# Patient Record
Sex: FEMALE | Race: WHITE | Employment: PART TIME | URBAN - METROPOLITAN AREA
[De-identification: names, ages, dates, MRNs, and addresses within clinical notes are randomized per-mention and may not be internally consistent; named-entity substitution may affect disease eponyms.]

---

## 2018-07-10 ENCOUNTER — HOSPITAL ENCOUNTER (OUTPATIENT)
Dept: PHYSICAL THERAPY | Age: 63
Discharge: HOME | End: 2018-07-10
Payer: COMMERCIAL

## 2018-07-10 PROCEDURE — 97162 PT EVAL MOD COMPLEX 30 MIN: CPT

## 2018-07-10 PROCEDURE — 97110 THERAPEUTIC EXERCISES: CPT

## 2018-07-10 NOTE — PROGRESS NOTES
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   ==================================================================================  Assessment / key information:  Pt is a 63 y.o. female who presents to [facility] at [facility] with Dx of R plantar fascitis. Patient reports initial onset of sx on 2018 with an EDDIE: insidious Patient reports sx are constant in nature. Walking, placing feet on the ground first thing in morning or after prolonged sitting increase sx, stretches decrease symptoms. Average reported pain level at 2-3/10, 10/10 at worst & 4/10 at best.  Patient reports improvement in symptoms while sleeping in DF night splint prescribed by D[facility] in [facility].     Objective evaluation findings are as follows: 1) tenderness to palpation along distal achilles achilles and on medial calcaneus. 2) DF with knee extended: R: 7, L 0 With knee flexed: R 10, L 7 degrees 3) notable increased calcaneal inversion and swelling below bilateral malloli L>R 4) minimal navicular drop noted with hyper mobile forefoot bilaterally 5) strength of R ankle ev, inv, and posterior tib is grossly 4-5, L foot is grossly 3+/5.       Patient can benefit from PT interventions to improve strength, range of motion, balance, proprioception, coordination, decrease pain, to facilitate ADLs & overall functional status.   ==================================================================================  Eval Complexity: History MEDIUM  Complexity : 1-2 comorbidities / personal factors will impact the outcome/ POC ;  Examination  MEDIUM Complexity : 3 Standardized tests and measures addressing body structure, function, activity limitation and / or participation in recreation ; Presentation MEDIUM Complexity : Evolving with changing characteristics ; Decision Making MEDIUM Complexity : FOTO score of 26-74; Overall Complexity MEDIUM  Problem List: pain affecting function, decrease ROM, decrease strength, edema affecting function, impaired gait/ balance, decrease ADL/ functional abilitiies, decrease activity tolerance, decrease flexibility/ joint mobility and decrease transfer abilities   Treatment Plan may include any combination of the following: Therapeutic exercise, Therapeutic activities, Neuromuscular re-education, Physical agent/modality, Gait/balance training, Manual therapy, Aquatic therapy, Patient education, Self Care training, Functional mobility training, Home safety training and Stair training  Patient / Family readiness to learn indicated by: asking questions, trying to perform skills and interest  Persons(s) to be included in education: patient (P)  Barriers to Learning/Limitations: None  Measures taken:    Patient Goal (s): \"pain relief\"   Patient self reported health status: excellent  Rehabilitation Potential: good   Short Term Goals: To be accomplished in  2  weeks:  1) Establish HEP to prevent further disability. 2) Patient will report decreased c/o pain to < or = 5/10 to facilitate ADLs with manageable sx in R foot. 3) Patient will be able to maintain SLS on firm ground for 30s with good stability to allow for ease with ADLs.  Long Term Goals:  To be accomplished in  4  weeks:  1) Pt ot be I and compliant with a progressive, high level HEP in order to maintain gains made in physical therapy. 2) patient to inc R DF AROM with knee extended by >/= 4 degrees to allow for ease with ambulation. 3) Patient will report 75% improvement in overall condition to return to PLOF. 4) patient will increase FOTO score to >/= 66 in order to allow for ease with ADLs. Frequency / Duration:   Patient to be seen  2  times per week for 4  weeks:  Patient / Caregiver education and instruction: self care, activity modification, brace/ splint application and exercises  G-Codes (GP): ABRAM  Therapist Signature: Eh Matos PT, DPT Date: 9/98/4806   Certification Period: NA Time: 6:03 PM   ===========================================================================================  I certify that the above Physical Therapy Services are being furnished while the patient is under my care. I agree with the treatment plan and certify that this therapy is necessary. Physician Signature:        Date:       Time:     Please sign and return to [facility] or you may fax the signed copy to [phone]. Thank you.

## 2018-07-12 ENCOUNTER — APPOINTMENT (OUTPATIENT)
Dept: PHYSICAL THERAPY | Age: 63
End: 2018-07-12
Payer: COMMERCIAL

## 2018-07-17 ENCOUNTER — HOSPITAL ENCOUNTER (OUTPATIENT)
Dept: PHYSICAL THERAPY | Age: 63
Discharge: HOME | End: 2018-07-17
Payer: COMMERCIAL

## 2018-07-17 PROCEDURE — 97110 THERAPEUTIC EXERCISES: CPT

## 2018-07-17 NOTE — PROGRESS NOTES
PHYSICAL THERAPY - DAILY TREATMENT NOTE    Patient Name: Viviane Parker        Date: 2018  : 1955   yes Patient  Verified  Visit #:     Insurance: Payor: Rico Barber / Plan: [place] [employer] P[place] / Product Type: PPO /      In time: 2:55P Out time: 3:40P   Total Treatment Time: 39     Medicare/ [facility] Time Tracking (below)   Total Timed Codes (min):  NA 1:1 Treatment Time:  NA     TREATMENT AREA =  Right foot pain [M79.671]    SUBJECTIVE  Pain Level (on 0 to 10 scale):  0  / 10   Medication Changes/New allergies or changes in medical history, any new surgeries or procedures?    no  If yes, update Summary List   Subjective Functional Status/Changes:  []  No changes reported     \"I feel great today, but had some bad days this past weekend. \"          OBJECTIVE  Modalities Rationale:PD   min [] Estim, type/location:                                      []  att     []  unatt     []  w/US     []  w/ice    []  w/heat    min []  Mechanical Traction: type/lbs                   []  pro   []  sup   []  int   []  cont    []  before manual    []  after manual    min []  Ultrasound, settings/location:      min []  Iontophoresis w/ dexamethasone, location:                                               []  take home patch       []  in clinic    min []  Ice     []  Heat    location/position:     min []  Vasopneumatic Device, press/temp:     min []  Other:    [] Skin assessment post-treatment (if applicable):    []  intact    []  redness- no adverse reaction     []redness - adverse reaction:        45 min Therapeutic Exercise:  [x]  See flow sheet   Rationale:      increase ROM, increase strength, improve coordination, improve balance and increase proprioception to improve the patients ability to perform unlimited ambulation      min Patient Education:  yes  Reviewed HEP   []  Progressed/Changed HEP based on: Other Objective/Functional Measures:    Initiated therex per flow sheet.   Quick fatigue with ankle 4 way  Poor control with BAPS      Post Treatment Pain Level (on 0 to 10) scale:   0  / 10     ASSESSMENT  Assessment/Changes in Function:     Patient tolerated initiation of therex well, has poor proprioception and motor control of the R ankle. []  See Progress Note/Recertification   Patient will continue to benefit from skilled PT services to modify and progress therapeutic interventions, address functional mobility deficits, address ROM deficits, address strength deficits, analyze and address soft tissue restrictions, analyze and cue movement patterns, analyze and modify body mechanics/ergonomics, assess and modify postural abnormalities, address imbalance/dizziness and instruct in home and community integration to attain remaining goals. Progress toward goals / Updated goals:    Progressing towards strength goals.       PLAN  [x]  Upgrade activities as tolerated yes Continue plan of care   []  Discharge due to :    []  Other:      Therapist: Alondra Coleman PT, DPT    Date: 7/17/2018 Time: 3:44 PM     Future Appointments  Date Time Provider Dunia Allen   7/19/2018 10:30 AM Elkin Salazar PT [facility]   7/24/2018 10:30 AM Norman Miles [facility]   7/26/2018 10:30 AM Elkin Salazar PT [facility]

## 2018-07-19 ENCOUNTER — HOSPITAL ENCOUNTER (OUTPATIENT)
Dept: PHYSICAL THERAPY | Age: 63
Discharge: HOME | End: 2018-07-19
Payer: COMMERCIAL

## 2018-07-19 PROCEDURE — 97110 THERAPEUTIC EXERCISES: CPT

## 2018-07-19 NOTE — PROGRESS NOTES
PHYSICAL THERAPY - DAILY TREATMENT NOTE    Patient Name: Xuan Mujica        Date: 2018  : 1955   YES Patient  Verified  Visit #:   3   of   12  Insurance: Payor: April Allison / Plan: [address] [place] [address] Sebastián West PPO / Product Type: PPO /      In time: 10:30 A Out time: 11:28 A   Total Treatment Time: 55/  40     Medicare Time Tracking (below)   Total Timed Codes (min):  NA 1:1 Treatment Time:  NA     TREATMENT AREA =  Right foot pain [M79.671]    SUBJECTIVE  Pain Level (on 0 to 10 scale):  3  / 10   Medication Changes/New allergies or changes in medical history, any new surgeries or procedures? NO    If yes, update Summary List   Subjective Functional Status/Changes:  []  No changes reported     Patient reports new complaints since last visit, her exercises are easier & not as painful.            OBJECTIVE  Modalities Rationale:     PD   min [] Estim, type/location:                                      []  att     []  unatt     []  w/US     []  w/ice    []  w/heat    min []  Mechanical Traction: type/lbs                   []  pro   []  sup   []  int   []  cont    []  before manual    []  after manual    min []  Ultrasound, settings/location:      min []  Iontophoresis w/ dexamethasone, location:                                               []  take home patch       []  in clinic    min []  Ice     []  Heat    location/position:     min []  Vasopneumatic Device, press/temp:     min []  Other:    [] Skin assessment post-treatment (if applicable):    []  intact    []  redness- no adverse reaction     []redness - adverse reaction:        55/  40 min Therapeutic Exercise:  [x]  See flow sheet   Rationale:      increase ROM and increase strength to improve the patients ability to return to pain-free standing      min Manual Therapy:    Rationale:          min Therapeutic Activity:    Rationale:         min Neuromuscular Re-ed:    Rationale:       min Gait Training:    Rationale:       min Patient Education:  Carlos Ferrell Reviewed HEP   []  Progressed/Changed HEP based on: Other Objective/Functional Measures:    TE per flow sheet     Post Treatment Pain Level (on 0 to 10) scale:   0  / 10     ASSESSMENT  Assessment/Changes in Function:     Difficulty with arch raises, due to weakness, improved tolerance to arch raises in sitting     []  See Progress Note/Recertification   Patient will continue to benefit from skilled PT services to modify and progress therapeutic interventions, address functional mobility deficits, address ROM deficits, address strength deficits, assess and modify postural abnormalities and instruct in home and community integration to attain remaining goals.    Progress toward goals / Updated goals:    Progressing towards STG 2, 3     PLAN  [x]  Upgrade activities as tolerated YES Continue plan of care   []  Discharge due to :    []  Other:      Therapist: Santiago Alexander PT    Date: 7/19/2018 Time: 1:32 PM     Future Appointments  Date Time Provider Dunia Allen   7/24/2018 10:30 AM [facility]   7/26/2018 3:30 PM [facility]

## 2018-07-24 ENCOUNTER — APPOINTMENT (OUTPATIENT)
Dept: PHYSICAL THERAPY | Age: 63
End: 2018-07-24
Payer: COMMERCIAL

## 2018-07-26 ENCOUNTER — APPOINTMENT (OUTPATIENT)
Dept: PHYSICAL THERAPY | Age: 63
End: 2018-07-26
Payer: COMMERCIAL

## 2018-07-31 ENCOUNTER — HOSPITAL ENCOUNTER (OUTPATIENT)
Dept: PHYSICAL THERAPY | Age: 63
Discharge: HOME | End: 2018-07-31
Payer: COMMERCIAL

## 2018-07-31 PROCEDURE — 97110 THERAPEUTIC EXERCISES: CPT

## 2018-07-31 NOTE — PROGRESS NOTES
PHYSICAL THERAPY - DAILY TREATMENT NOTE    Patient Name: Nikolay Flores        Date: 2018  : 1955   YES Patient  Verified  Visit #:     Insurance: Payor: Nena Reis / Plan: [place] [employer] PPO / Product Type: PPO /      In time: 12 P Out time: 12:50 P    Total Treatment Time: 45     Medicare Time Tracking (below)   Total Timed Codes (min):  NA 1:1 Treatment Time:  NA     TREATMENT AREA =  Right foot pain [M79.671]    SUBJECTIVE  Pain Level (on 0 to 10 scale):  0  / 10   Medication Changes/New allergies or changes in medical history, any new surgeries or procedures? NO    If yes, update Summary List   Subjective Functional Status/Changes:  []  No changes reported     Patient reports onset of pain in R 4th/5th toe on  when she banged her foot on something at home which brought her to the urgent care on , X-rays were (-). She can only tolerate wearing an open toed sandal/flip flop because of the pressure on her R outside of her foot. Her pain from the plantar fasciitis is much better, 75% improvement overall. Pain level at 8/10 at worst which is very rare & almost nonexistent, otherwise most of her pain is \"discomfort\", the pm splint helps a lot.            OBJECTIVE  Modalities Rationale:      min [] Estim, type/location:                                      []  att     []  unatt     []  w/US     []  w/ice    []  w/heat    min []  Mechanical Traction: type/lbs                   []  pro   []  sup   []  int   []  cont    []  before manual    []  after manual    min []  Ultrasound, settings/location:      min []  Iontophoresis w/ dexamethasone, location:                                               []  take home patch       []  in clinic    min []  Ice     []  Heat    location/position:     min []  Vasopneumatic Device, press/temp:     min []  Other:    [] Skin assessment post-treatment (if applicable):    []  intact    []  redness- no adverse reaction     []redness - adverse reaction: 40 min Therapeutic Exercise:  [x]  See flow sheet   Rationale:      increase ROM, increase strength, improve balance and increase proprioception to improve the patients ability to tolerate prolonged ambulation       min Manual Therapy:    Rationale:          min Therapeutic Activity:    Rationale:         min Neuromuscular Re-ed:    Rationale:       min Gait Training:    Rationale:       min Patient Education:  YES  Reviewed HEP   []  Progressed/Changed HEP based on: Other Objective/Functional Measures:    MMT: R ankle PF 4/5 (mild c/o pain upon MMT in WBing)  Df: pain-free active/passively WFL  SLS 30 secs     Post Treatment Pain Level (on 0 to 10) scale:   0  / 10     ASSESSMENT  Assessment/Changes in Function:     No change in status despite injury to R 4-5th toes, pt able to tolerate TE (modified per flow sheet) with no increase in R foot/ankle pain      []  See Progress Note/Recertification   Patient will continue to benefit from skilled PT services to modify and progress therapeutic interventions, address functional mobility deficits, address ROM deficits, address strength deficits, analyze and modify body mechanics/ergonomics, assess and modify postural abnormalities, address imbalance/dizziness and instruct in home and community integration to attain remaining goals.    Progress toward goals / Updated goals:    Progressing towards LTG 4, all STGs met, LTG 2, 3 met     PLAN  [x]  Upgrade activities as tolerated YES Continue plan of care   []  Discharge due to :    [x]  Other: Plan for DC n/v     Therapist: Hernan Delgado PT    Date: 7/31/2018 Time: 12:13 PM     Future Appointments  Date Time Provider Dunia Allen   8/2/2018 12:00 PM [facility] [place]

## 2018-08-02 ENCOUNTER — HOSPITAL ENCOUNTER (OUTPATIENT)
Dept: PHYSICAL THERAPY | Age: 63
Discharge: HOME | End: 2018-08-02
Payer: COMMERCIAL

## 2018-08-02 PROCEDURE — 97110 THERAPEUTIC EXERCISES: CPT

## 2018-08-02 NOTE — PROGRESS NOTES
PHYSICAL THERAPY - DAILY TREATMENT NOTE    Patient Name: Nohemi Tillman        Date: 2018  : 1955   yes Patient  Verified  Visit #:     Insurance: Payor: Frances Ponce / Plan: [place] [employer] PPO / Product Type: PPO /      In time: 12:00P Out time: 12:50P   Total Treatment Time: 48     Medicare/ [facility] Time Tracking (below)   Total Timed Codes (min):  NA 1:1 Treatment Time:  NA     TREATMENT AREA =  Right foot pain [M79.671]    SUBJECTIVE  Pain Level (on 0 to 10 scale):  6  / 10   Medication Changes/New allergies or changes in medical history, any new surgeries or procedures?    no  If yes, update Summary List   Subjective Functional Status/Changes:  []  No changes reported     \"My foot is hurting a lot today, but that is because I didn't do any of my exercises or stretching the last two days. \"          OBJECTIVE  Modalities Rationale:     decrease inflammation and decrease pain to improve patient's ability to perform unlimited ADLs   min [] Estim, type/location:                                      []  att     []  unatt     []  w/US     []  w/ice    []  w/heat    min []  Mechanical Traction: type/lbs                   []  pro   []  sup   []  int   []  cont    []  before manual    []  after manual    min []  Ultrasound, settings/location:      min []  Iontophoresis w/ dexamethasone, location:                                               []  take home patch       []  in clinic   10 min [x]  Ice     []  Heat    location/position: Long sit to R foot and ankle.      min []  Vasopneumatic Device, press/temp:     min []  Other:    [] Skin assessment post-treatment (if applicable):    []  intact    []  redness- no adverse reaction     []redness - adverse reaction:        40 min Therapeutic Exercise:  [x]  See flow sheet   Rationale:      increase ROM, increase strength, improve coordination, improve balance and increase proprioception to improve the patients ability to perform unlimited ADLs        min Patient Education:  yes  Reviewed HEP   []  Progressed/Changed HEP based on: Other Objective/Functional Measures:    See DC     Post Treatment Pain Level (on 0 to 10) scale:   2  / 10     ASSESSMENT  Assessment/Changes in Function:     See DC      []  See Progress Note/Recertification   Patient will continue to benefit from skilled PT services to modify and progress therapeutic interventions, address functional mobility deficits, address ROM deficits, address strength deficits, analyze and address soft tissue restrictions, analyze and cue movement patterns, analyze and modify body mechanics/ergonomics, assess and modify postural abnormalities, address imbalance/dizziness and instruct in home and community integration to attain remaining goals. Progress toward goals / Updated goals:    See DC      PLAN  []  Upgrade activities as tolerated no Continue plan of care   [x]  Discharge due to : Met or progressing towards all goals. []  Other:      Therapist: Irene Fay PT, DPT    Date: 8/2/2018 Time: 2:20 PM     No future appointments.

## 2018-08-09 NOTE — PROGRESS NOTES
[address] [place] [address] PHYSICAL THERAPY AT [address] [place] [address]  [address], [address] [place] [address], [address]  Phone: [phone]  Fax: [phone] SUMMARY  Patient Name: Sindy Haas : 1955   Treatment/Medical Diagnosis: Right foot pain [M79.671]   Referral Source: Marlee Mauro PA-C     Date of Initial Visit: 7/10/18 Attended Visits: 5 Missed Visits: 1     SUMMARY OF TREATMENT  Therapeutic exercise to increase strength, range of motion, balance, proprioception, coordination. Modalities as needed for pain control. CURRENT STATUS  Ms. Retana has made good progress with physical therapy. She reports that she feels 75% improved since starting physical therapy and understands the importance of maintaining her home exercise program.  Patient was agreeable to safe DC from physical therapy with a home exercise program.      Goal/Measure of Progress Goal Met? 1.  Pt ot be I and compliant with a progressive, high level HEP in order to maintain gains made in physical therapy. Status at last Eval: established Current Status: I and compliant yes   2.  patient to inc R DF AROM with knee extended by >/= 4 degrees to allow for ease with ambulation   Status at last Eval: DF with knee extended: R: 7, L 0 With knee flexed: R 10, L 7 degrees  Current Status: WNL passively yes   3. Patient will report 75% improvement in overall condition to return to PLOF. Status at last Eval: - Current Status: 75% yes   4.  patient will increase FOTO score to >/= 66 in order to allow for ease with ADLs   Status at last Eval: 46 Current Status: 62 no     RECOMMENDATIONS  Discontinue therapy. Progressing towards or have reached established goals. If you have any questions/comments please contact us directly at [phone]. Thank you for allowing us to assist in the care of your patient.     Therapist Signature: Kanika Blue PT, DPT Date: 18     Time: 12:08 PM

## 2022-08-12 ENCOUNTER — HOSPITAL ENCOUNTER (OUTPATIENT)
Dept: PHYSICAL THERAPY | Age: 67
Discharge: HOME | End: 2022-08-12
Payer: MEDICARE

## 2022-08-12 PROCEDURE — 97530 THERAPEUTIC ACTIVITIES: CPT | Performed by: PHYSICAL THERAPIST

## 2022-08-12 PROCEDURE — 97162 PT EVAL MOD COMPLEX 30 MIN: CPT | Performed by: PHYSICAL THERAPIST

## 2022-08-12 NOTE — PROGRESS NOTES
[address] PHYSICAL THERAPY AT [address] [place] [address]  Asif Mcghee [address], [address] ,[address], [address]  Phone: [phone]  Fax: [phone] / [address] [place] [address] PHYSICAL THERAPY SERVICES  Patient Name: Naila Sánchez : 1955   Medical   Diagnosis: Left ankle pain [M25.572] Treatment Diagnosis: S/p L ankle peroneal repair   Onset Date: 22     Referral Source: Jimi Rowell MD Start of Care [facility]): 2022   Prior Hospitalization: See medical history Provider #: [id number]   Prior Level of Function: Indep with ambulation and ADLS   Comorbidities: Hx of hepatitis   Medications: Verified on Patient Summary List   The Plan of Care and following information is based on the information from the initial evaluation.   =================================================================================  Assessment / key information:  Patient is a 61 y.o. female who presents to [facility] with Dx of L peroneal tear. Patient presents s/p L ankle peroneal repair. DOS; 22. Patient reports L ankle pain for >4yrs due to wear and tear. 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 Pt had R TKA ~1yr ago . MRI reports tear of Peroneal tendon. Pt was NWB in a splint for 2 weeks and then to be in CAM boot for 4 weeks. Paitient was given a brace for ankle to wean out of CAM boot at [facility]. Pt reports she is unable to wear brace due to discomfort at this time. Patient taking Oxy for pain control along with Motrin. Pain ranges from 2 to 5, with increased pain in am. Pain increases with WB out of boot, prolonged walking.   Gait: [] Normal    [] Abnormal    [x] Antalgic    [] NWB    Device:CAM boot  ROM/Strength  [] Unable to assess at this time      AROM        PROM            Strength (1-5)   Left Right Left Right Left  Right   Dorsiflexsion 3 14 6  4 5   Plantarflexsion 48 60 50  4 with break test 4+   Inversion 38 52 35  4 NT   Eversion 12 22 6  NT NT   Great Toe Ext 50 WNL   4+ 5   Great Toe Flex painful WNL full  4 5     Flexibility:   Gastroc/soleus:    (L) Tightness [] WNL   [] Min   [x] Mod   [] Severe    (R) Tightness [x] WNL   [] Min   [] Mod   [] Severe  Sensation: decreased to light \touch on lateral border of foot, TTP   Incision ~9cm over lateral ankle with good healing noted and no signs of infection  Balance: ROM EC: 30sec, Tandem L in rear: <5sec  Sub-talor alignment: [] Neutral     [] Pronation      [x] Supination: very mild calcaneal varus  Forefoot alignment:  [x] Neutral     [] Varus            [] Valgus  Swelling:  L :59.5 cm, R: 55.5 cm with figure 8   Patient scored 58 on FOTO indicating decreased functional activity level and QOL. A home exercise program was demonstrated and provided to address the above objective and functional deficits.  Patient can benefit from PT interventions to improve ROM, strength, balance, decrease pain and swelling, to facilitate ADLs & overall functional status.   =================================================================================  Eval Complexity: History: MEDIUM  Complexity : 1-2 comorbidities / personal factors will impact the outcome/ POC Exam:MEDIUM Complexity : 3 Standardized tests and measures addressing body structure, function, activity limitation and / or participation in recreation  Presentation: MEDIUM Complexity : Evolving with changing characteristics  Clinical   Decision Making:MEDIUM Complexity : FOTO score of 26-74Overall Complexity:MEDIUM  Problem List: pain affecting function, decrease ROM, decrease strength, edema affecting function, impaired gait/ balance, decrease ADL/ functional abilitiies, decrease activity tolerance, decrease flexibility/ joint mobility, and decrease transfer abilities   Treatment Plan may include any combination of the following: Therapeutic exercise, Therapeutic activities, Neuromuscular re-education, Physical agent/modality, Gait/balance training, Manual therapy, Aquatic therapy, Patient education, Self Care training, Functional mobility training, Home safety training, and Stair training  Patient / Family readiness to learn indicated by: asking questions, trying to perform skills, and interest  Persons(s) to be included in education: patient (P)  Barriers to Learning/Limitations: None  Measures taken:    Patient Goal (s): Walk without brace, increase strength   Patient self reported health status: good  Rehabilitation Potential: good  Short Term Goals: To be accomplished in  2  weeks:  1. Patient will be compliant with initial HEP for sx management to address the above listed deficits. 2. Patient will have decreased swelling to 57 cm (figure 8) to facilitate improved functional mobility end endurance and progression out of boot and into brace. Long Term Goals: To be accomplished in  8-12  treatments:  Patient to be independent & compliant with HEP in preparation for D/C. Patient to increase FOTO score to 68 indicating improved functional abilities and QOL. Patient to increase strength in L ankle globally  to 5 to facilitate prolonged standing and walking endurance. Patient to increase DF of L ankle to 10 deg to facilitate normal gait and stairs. Patient to increase  SLS to 15 sec for negotiation of obstacles and reduced risk of falls  Frequency / Duration:   Patient to be seen  2-3  times per week for 8-12  treatments:  (All LTG as above will be assessed and updated every 10 visits or 30 days and progressed as needed)  Patient / Caregiver education and instruction: exercises    Therapist Signature: Jair Ventura, PT Date: 6/53/5733   Certification Period: 8-12-22 to 11-11-22 Time: 7:52 AM   ===========================================================================================  I certify that the above Physical Therapy Services are being furnished while the patient is under my care. I agree with the treatment plan and certify that this therapy is necessary. Physician Signature:        Date:       Time:     Please sign and return to [facility] or you may fax the signed copy to [phone]. Thank you.                                         Flako Garg MD    Allergies checked: y

## 2022-08-12 NOTE — PROGRESS NOTES
PT  EVAL AND TREATMENT    Patient Name: Ty Duong  Date:2022  : 1955  [x]  Patient  Verified  Payor: Magy Renteria / Plan: [place] MED[place] & B / Product Type: Medicare /    In time:835  Out time:920am  Total Treatment Time (min): 45min  Total Timed Codes (min): 10  1:1 Treatment Time ( [address] only): 45   Visit #: 1 of     Treatment Area: Left ankle pain [M25.572]  Pain in: 2  Pain out 1-2    Objective evaluation:  Physical Therapy Evaluation  - Foot and Ankle  Patient presents s/p L ankle peroneal repair. DOS; 22. Patient reports L ankle pain for >4yrs due to wear and tear. 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 Pt had R TKA ~1yr ago . MRI reports tear of Peroneal tendon. Pt was NWB in a splint for 2 wks and then in CAM boot for 4 weeks. Paitient was given a brace for ankle to wean out of CAM boot. Pt reports she is unable to wear brace due to discomfort. Patient taking Oxy for pain control along with Motrin. Pain ranges from 2 to 5, with increased pain in am. Pain increases with WB out of boot, prolonged walking.   Gait: [] Normal    [] Abnormal    [x] Antalgic    [] NWB    Device:CAM boot  ROM/Strength  [] Unable to assess at this time      AROM        PROM            Strength (1-5)   Left Right Left Right Left  Right   Dorsiflexsion 3 14 6  4 5   Plantarflexsion 48 60 50  4 with break test 4+   Inversion 38 52 35  4 NT   Eversion 12 22 6  NT NT   Great Toe Ext 50 WNL   4+ 5   Great Toe Flex painful WNL full  4 5     Flexibility: [] Unable to assess at this time  Gastroc:    (L) Tightness [] WNL   [] Min   [x] Mod   [] Severe    (R) Tightness [x] WNL   [] Min   [] Mod   [] Severe  Palpation: decreased to light \touch on lateral border of foot,   Incision ~9cm over lateral ankle   Optional Tests:  Balance: ROM EC: 30sec, Tandem L in rear: <5sec  Sub-talor alignment: [] Neutral     [] Pronation      [x] Supination: very mild  Forefoot alignment:  [x] Neutral     [] Varus            [] Valgus  Calcaneal varus  Swelling:  L :59.5 cm, R: 55.5 cm with figure 8          Justification for Eval Code Complexity:  Patient History : Hx of hepatitis  Examination see exam as above   Clinical Presentation: evolving  Clinical Decision Making : FOTO : 58 /100    Modality rationale: decrease edema, decrease inflammation, and decrease pain to improve the patients ability to allow for normalized gait   Min Type Additional Details    [] Estim:  []Unatt       []IFC  []Premod                        []Other:  []w/ice   []w/heat  Position:  Location:    [] Estim: []Att    []TENS instruct  []NMES                    []Other:  []w/US   []w/ice   []w/heat  Position:  Location:    []  Traction: [] Cervical       []Lumbar                       [] Prone          []Supine                       []Intermittent   []Continuous Lbs:  [] before manual  [] after manual    []  Ultrasound: []Continuous   [] Pulsed                           []1MHz   []3MHz W/cm2:  Location:   10 [x]  Ice     []  heat  []  Ice massage  []  Laser   []  Anodyne Position: reclined to L ankle  Location:    []  Vasopneumatic Device  Pre-treatment girth:   Post-treatment girth:   Measured at landmark location:  Pressure:       [] lo [] med [] hi   Temperature: [] lo [] med [] hi   [] Skin assessment post-treatment:  []intact []redness- no adverse reaction    []redness - adverse reaction:   Vasopnuematic compression justification:  Per bilateral girth measures taken and listed above the edema is considered significant and having an impact on the patient's strength and gait        10 min Therapeutic Activity:  []  See flow sheet :Patient education on therapy assessment, prognosis, expectations for therapy sessions, patient goals, and HEP Development.    Rationale: increase ROM, increase strength, and improve coordination  to improve the patients ability to perform functional mobility and improve activity endurance          ASSESSMENT  [x]  See Plan of Care    PLAN  [x]  Upgrade activities as tolerated     [x] Other:_ POC  Patient to be seen 2-3 /wk for 8-12 treatments.        Cecil Belle, PT 8/12/2022  7:53 AM

## 2022-08-15 ENCOUNTER — APPOINTMENT (OUTPATIENT)
Dept: PHYSICAL THERAPY | Age: 67
End: 2022-08-15
Payer: MEDICARE

## 2022-08-17 ENCOUNTER — HOSPITAL ENCOUNTER (OUTPATIENT)
Dept: PHYSICAL THERAPY | Age: 67
Discharge: HOME | End: 2022-08-17
Payer: MEDICARE

## 2022-08-17 PROCEDURE — 97140 MANUAL THERAPY 1/> REGIONS: CPT

## 2022-08-17 PROCEDURE — 97112 NEUROMUSCULAR REEDUCATION: CPT

## 2022-08-17 PROCEDURE — 97110 THERAPEUTIC EXERCISES: CPT

## 2022-08-17 NOTE — PROGRESS NOTES
PHYSICAL THERAPY - DAILY TREATMENT NOTE     Patient Name: Forest Moseley        Date: 2022  : 1955   YES Patient  Verified  Visit #:   2   of     Insurance: Payor: Sawyer Sams / Plan: [place] MEDICARE PART A & B / Product Type: Medicare /      In time: 425 Out time: 684   Total Treatment Time: 55     Medicare/BCBS Time Tracking (below)   Total Timed Codes (min):  45 1:1 Treatment Time:  45     TREATMENT AREA =  Left ankle pain [M25.572]    SUBJECTIVE    Pain Level (on 0 to 10 scale):  2-5  / 10   Medication Changes/New allergies or changes in medical history, any new surgeries or procedures? NO    If yes, update Summary List   Subjective Functional Status/Changes:  []  No changes reported       Functional improvements: \"I am having a real hard time with this new brace. It is very uncomfortable. \"  Functional impairments: Decreased ROM and strength affecting gait and ADL's         OBJECTIVE  Modalities Rationale:     decrease edema, decrease inflammation, and decrease pain to improve patient's ability to ambulate w/o pain      min [] Estim, type/location:                                      []  att     []  unatt     []  w/US     []  w/ice    []  w/heat    min []  Mechanical Traction: type/lbs                   []  pro   []  sup   []  int   []  cont    []  before manual    []  after manual    min []  Ultrasound, settings/location:      min []  Iontophoresis w/ dexamethasone, location:                                               []  take home patch       []  in clinic   10 min [x]  Ice     []  Heat    location/position: Supine to left ankle    min []  Vasopneumatic Device, press/temp:  If using vaso (only need to measure limb vaso being performed on)      pre-treatment girth :       post-treatment girth :       measured at (landmark location) :       min []  Other:    [x] Skin assessment post-treatment (if applicable):    [x]  intact    [x]  redness- no adverse reaction     []redness - adverse reaction:      15 min Therapeutic Exercise:  [x]  See flow sheet   Rationale:      increase ROM and increase strength to improve the patients ability to perform ADL's w/ equal weight bearing and full active ROM     15 min Neuromuscular Re-ed: [x]  See flow sheet   Rationale:      increase strength, improve coordination, improve balance, and increase proprioception to improve the patients ability to ambulate with normal gait on even and uneven surfaces     10 min Manual Therapy: Technique:      [x] S/DTM []IASTM [x]PROM [x] Passive Stretching   []manual TPR    []Jt manipulation:Gr I [] II []  III [] IV[] V[]  Treatment Area:  L ankle all planes, scar mobilization   Rationale:      decrease pain, increase ROM, and increase tissue extensibility to improve patient's ability to ambulate w/ normal gait. The manual therapy interventions were performed at a separate and distinct time from the therapeutic activities interventions. Billed With/As:   [x] TE   [] TA   [x] Neuro   [] Self Care Patient Education: [x] Review HEP    [] Progressed/Changed HEP based on:   [] positioning   [] body mechanics   [] transfers   [] heat/ice application    [] other:        Other Objective/Functional Measures:    Reviwed and corrected HEP. Initiated therex/activities per flow sheet     Post Treatment Pain Level (on 0 to 10) scale:   0  / 10     ASSESSMENT    Assessment/Changes in Function:     Patient tolerated initiation of treatment well without increased pain. Significant difficulty with activities requiring intrinsic musculature and isolated movements of the left ankle.      []  See Progress Note/Recertification   Patient will continue to benefit from skilled PT services to modify and progress therapeutic interventions, address functional mobility deficits, address ROM deficits, address strength deficits, analyze and address soft tissue restrictions, analyze and cue movement patterns, analyze and modify body mechanics/ergonomics, and assess and modify postural abnormalities to attain remaining goals.       Progress toward goals / Updated goals:    Progressing towards newly established goals     PLAN    [x]  Upgrade activities as tolerated YES Continue plan of care   []  Discharge due to :    []  Other:      Therapist: Helene Pinto PT    Date: 8/17/2022 Time: 8:19 AM     Future Appointments   Date Time Provider Dunia Allen   8/17/2022 11:30 AM Yuridia Arredondo, PT [facility]   8/19/2022 11:30 AM Yuridia Arredondo, PT [facility]   8/23/2022  7:15 AM Puma Priest, PT [facility]   8/25/2022  7:15 AM Puma Priest, PT [facility]   8/29/2022 12:00 PM Amanda Griffiths, PT [facility]   8/31/2022 11:30 AM Yuridia Arredondo, PT [facility]   9/6/2022  7:15 AM Puma Priest, PT [facility]   9/8/2022  7:15 AM Nelly Beauchamp, PT [facility]

## 2022-08-19 ENCOUNTER — HOSPITAL ENCOUNTER (OUTPATIENT)
Dept: PHYSICAL THERAPY | Age: 67
Discharge: HOME | End: 2022-08-19
Payer: MEDICARE

## 2022-08-19 PROCEDURE — 97535 SELF CARE MNGMENT TRAINING: CPT

## 2022-08-19 PROCEDURE — 97110 THERAPEUTIC EXERCISES: CPT

## 2022-08-19 PROCEDURE — 97140 MANUAL THERAPY 1/> REGIONS: CPT

## 2022-08-19 NOTE — PROGRESS NOTES
PHYSICAL THERAPY - DAILY TREATMENT NOTE     Patient Name: Olga Lidia Rivera        Date: 2022  : 1955   YES Patient  Verified  Visit #:   3     Insurance: Payor: Ashely Crenshaw / Plan: [place] MEDICARE PART A & B / Product Type: Medicare /      In time: 3175 Out time: 1225   Total Treatment Time: 55     Medicare/BCBS Time Tracking (below)   Total Timed Codes (min):  45 1:1 Treatment Time:  45     TREATMENT AREA =  Left ankle pain [M25.572]    SUBJECTIVE    Pain Level (on 0 to 10 scale):  1  / 10   Medication Changes/New allergies or changes in medical history, any new surgeries or procedures? NO    If yes, update Summary List   Subjective Functional Status/Changes:  []  No changes reported       Functional improvements: \"I am trying to stay out of the boot more.   I brought the brace\"  Functional impairments: Decreased ROM, balance, and strength affecting ADL's and ambulation         OBJECTIVE  Modalities Rationale:     decrease edema, decrease inflammation, and decrease pain to improve patient's ability to ambulate w/o pain      min [] Estim, type/location:                                      []  att     []  unatt     []  w/US     []  w/ice    []  w/heat    min []  Mechanical Traction: type/lbs                   []  pro   []  sup   []  int   []  cont    []  before manual    []  after manual    min []  Ultrasound, settings/location:      min []  Iontophoresis w/ dexamethasone, location:                                               []  take home patch       []  in clinic   10 min [x]  Ice     []  Heat    location/position: Supine to L ankle following treatment session    min []  Vasopneumatic Device, press/temp:  If using vaso (only need to measure limb vaso being performed on)      pre-treatment girth :       post-treatment girth :       measured at (landmark location) :       min []  Other:    [x] Skin assessment post-treatment (if applicable):    [x]  intact    [x]  redness- no adverse reaction     []redness - adverse reaction:      20 min Therapeutic Exercise:  [x]  See flow sheet   Rationale:      increase ROM and increase strength to improve the patients ability to perform ADL's safely    5 NB min Neuromuscular Re-ed: [x]  See flow sheet   Rationale:      increase strength, improve coordination, improve balance, and increase proprioception to improve the patients ability to ambulate  on even and uneven surfaces w/ normal gait and no LOB    10 min Manual Therapy: Technique:      [x] S/DTM []IASTM [x]PROM [x] Passive Stretching   []manual TPR    []Jt manipulation:Gr I [] II []  III [] IV[] V[]  Treatment Area: L ankle    Rationale:      decrease pain, increase ROM, and decrease edema  to improve patient's ability to ambulate full dorsiflexion. The manual therapy interventions were performed at a separate and distinct time from the therapeutic activities interventions. 10 min Self Care: Instruction in don/doff left ankle brace. Cues for proper stance time and weight bearing through L LE   Rationale:     Proper brace use  to improve the patients ability to ambulate w/ normal gait and no pain    Billed With/As:   [x] TE   [] TA   [x] Neuro   [] Self Care Patient Education: [x] Review HEP    [] Progressed/Changed HEP based on:   [] positioning   [] body mechanics   [] transfers   [] heat/ice application    [] other:        Other Objective/Functional Measures:    See flow sheet  Mild resistance applied by therapist for all planes AROM     Post Treatment Pain Level (on 0 to 10) scale:   0  / 10     ASSESSMENT    Assessment/Changes in Function:     Improved intrinsic movement  noted today. Mild difficulty isolating IV/EV, requiring verbal and tactile cues. Fair tolerance to ankle brace; vc's required for increased stance time on left during ambulation with brace.      []  See Progress Note/Recertification   Patient will continue to benefit from skilled PT services to modify and progress therapeutic interventions, address functional mobility deficits, address ROM deficits, address strength deficits, analyze and address soft tissue restrictions, analyze and cue movement patterns, analyze and modify body mechanics/ergonomics, and assess and modify postural abnormalities to attain remaining goals.       Progress toward goals / Updated goals:    Good progress towards all STG's     PLAN     [x] Upgrade activities as tolerated YES Continue plan of care   []  Discharge due to :    []  Other:      Therapist: Deandre Small PT    Date: 8/19/2022 Time: 9:03 AM     Future Appointments   Date Time Provider Dunia Allen   8/19/2022 11:30 AM Obed Chowdary, PT [facility]   8/23/2022  7:15 AM Amrita Menchaca, PT [facility]   8/25/2022  7:15 AM Amrita Menchaca, PT [facility]   8/29/2022 12:00 PM Bill Diaz, PT [facility]   8/31/2022 11:30 AM Obed Chowdary, PT [facility]   9/6/2022  7:15 AM Amrita Menchaca PT [facility]   9/8/2022  7:15 AM Ryder Beauchamp, PT [facility]

## 2022-08-23 ENCOUNTER — HOSPITAL ENCOUNTER (OUTPATIENT)
Dept: PHYSICAL THERAPY | Age: 67
Discharge: HOME | End: 2022-08-23
Payer: MEDICARE

## 2022-08-23 PROCEDURE — 97110 THERAPEUTIC EXERCISES: CPT

## 2022-08-23 PROCEDURE — 97535 SELF CARE MNGMENT TRAINING: CPT

## 2022-08-23 PROCEDURE — 97112 NEUROMUSCULAR REEDUCATION: CPT

## 2022-08-23 NOTE — PROGRESS NOTES
PHYSICAL THERAPY - DAILY TREATMENT NOTE    Patient Name: Ted Rivera        Date: 2022  : 1955   YES Patient  Verified  Visit #:      12  Insurance: Payor: Hermes Campbell / Plan: [place] MED[place] & B / Product Type: Medicare /      In time: 7:15 A Out time: 8 A   Total Treatment Time: 45     BCBS/Medicare Time Tracking (below)   Total Timed Codes (min):  NA 1:1 Treatment Time:  NA     TREATMENT AREA =  Left ankle pain [M25.572]    SUBJECTIVE  Pain Level (on 0 to 10 scale):  1  / 10   Medication Changes/New allergies or changes in medical history, any new surgeries or procedures? NO    If yes, update Summary List   Subjective Functional Status/Changes:  []  No changes reported     Patient reports she doesn't wear her brace in the house but she wears her brace  when she goes to work & when she leave her home.          Modalities Rationale:     Patient deferred   min [] Estim, type/location:                                      []  att     []  unatt     []  w/US     []  w/ice    []  w/heat    min []  Mechanical Traction: type/lbs                   []  pro   []  sup   []  int   []  cont    []  before manual    []  after manual    min []  Ultrasound, settings/location:      min []  Iontophoresis w/ dexamethasone, location:                                               []  take home patch       []  in clinic    min []  Ice     []  Heat    location/position:     min []  Vasopneumatic Device, press/temp:    If using vaso (only need to measure limb vaso being performed on)      pre-treatment girth :       post-treatment girth :       measured at (landmark location) :      min []  Other:    [] Skin assessment post-treatment (if applicable):    []  intact    []  redness- no adverse reaction                  []redness - adverse reaction:        20 min Therapeutic Exercise:  [x]  See flow sheet   Rationale:      increase ROM and increase strength to improve the patients ability to return to pain-free standing     10 min Self Care: Reviewed use of ankle brace as well as appropriate shoewear to accommodate brace (patient came to treatment today wearing flip flops without brace)   Rationale:    increase ROM, increase strength, improve balance, and increase proprioception to improve the patients ability to decrease risk of fall with work/ADLs    15 min Neuromuscular Re-ed: [x]  See flow sheet   Rationale:    increase ROM, increase strength, improve balance, and increase proprioception to improve the patients ability to return to walking with improved gait mechanics      Billed With/As:   [] TE   [] TA   [] Neuro   [] Self Care Patient Education: [x] Review HEP    [] Progressed/Changed HEP based on:   [] positioning   [] body mechanics   [] transfers   [] heat/ice application    [] other:      Other Objective/Functional Measures: Added SLS on [address] board (B UE support) in both 1/3 & 2/4 planes of motion     Post Treatment Pain Level (on 0 to 10) scale:   0  / 10     ASSESSMENT  Assessment/Changes in Function:     Good tolerance to progression of balance exercises today     []  See Progress Note/Recertification   Patient will continue to benefit from skilled PT services to modify and progress therapeutic interventions, address functional mobility deficits, address ROM deficits, address strength deficits, analyze and address soft tissue restrictions, analyze and cue movement patterns, analyze and modify body mechanics/ergonomics, assess and modify postural abnormalities, and instruct in home and community integration to attain remaining goals.    Progress toward goals / Updated goals:    Progressing towards LTG 5     PLAN  []  Upgrade activities as tolerated YES Continue plan of care   []  Discharge due to :    []  Other:      Therapist: Richrad Julio, PT    Date: 8/23/2022 Time: 8:09 AM     Future Appointments   Date Time Provider Dunia Allen   8/25/2022  7:15 AM Micha Beauchamp, PT [facility]   8/29/2022 12:00 PM Toña Rosas, PT [facility]   8/31/2022 11:30 AM Summer Castle, PT [facility]   9/6/2022  7:15 AM Saundra Grove, PT [facility]   9/8/2022  7:15 AM Eliceo Beauchamp, PT [facility]

## 2022-08-25 ENCOUNTER — HOSPITAL ENCOUNTER (OUTPATIENT)
Dept: PHYSICAL THERAPY | Age: 67
Discharge: HOME | End: 2022-08-25
Payer: MEDICARE

## 2022-08-25 PROCEDURE — 97112 NEUROMUSCULAR REEDUCATION: CPT

## 2022-08-25 PROCEDURE — 97530 THERAPEUTIC ACTIVITIES: CPT

## 2022-08-25 PROCEDURE — 97110 THERAPEUTIC EXERCISES: CPT

## 2022-08-25 NOTE — PROGRESS NOTES
PHYSICAL THERAPY - DAILY TREATMENT NOTE    Patient Name: Romeo Mcclain        Date: 2022  : 1955   YES Patient  Verified  Visit #:     Insurance: Payor: [facility] [employer] / Plan: [place] MEDICARE PART A & B / Product Type: Medicare /      In time: 7:15 A Out time: 8:10 A   Total Treatment Time: 50     BCBS/Medicare Time Tracking (below)   Total Timed Codes (min):  40 1:1 Treatment Time:  40     TREATMENT AREA =  Left ankle pain [M25.572]    SUBJECTIVE  Pain Level (on 0 to 10 scale):  1  / 10   Medication Changes/New allergies or changes in medical history, any new surgeries or procedures? NO    If yes, update Summary List   Subjective Functional Status/Changes:  []  No changes reported     Patient reports no new complaints since last treatment, she will be going out of town to [facility] this weekend & she knows that she needs to wear her brace.             Modalities Rationale:     Patient deferred   min [] Estim, type/location:                                      []  att     []  unatt     []  w/US     []  w/ice    []  w/heat    min []  Mechanical Traction: type/lbs                   []  pro   []  sup   []  int   []  cont    []  before manual    []  after manual    min []  Ultrasound, settings/location:      min []  Iontophoresis w/ dexamethasone, location:                                               []  take home patch       []  in clinic    min []  Ice     []  Heat    location/position:     min []  Vasopneumatic Device, press/temp:    If using vaso (only need to measure limb vaso being performed on)      pre-treatment girth :       post-treatment girth :       measured at (landmark location) :      min []  Other:    [] Skin assessment post-treatment (if applicable):    []  intact    []  redness- no adverse reaction                  []redness - adverse reaction:        20 min Therapeutic Exercise:  [x]  See flow sheet   Rationale:      increase ROM and increase strength to improve the patients ability to return to pain-free standing     10 min Therapeutic Activity: [x]  See flow sheet   Rationale:    increase strength, improve coordination, improve balance, and increase proprioception to improve the patients ability to return to pain-free walking program    10 min Neuromuscular Re-ed: [x]  See flow sheet   Rationale:    improve coordination, improve balance, and increase proprioception to improve the patients ability to return to ADLs with decreased fall risk      Billed With/As:   [] TE   [] TA   [] Neuro   [] Self Care Patient Education: [x] Review HEP    [] Progressed/Changed HEP based on:   [] positioning   [] body mechanics   [] transfers   [] heat/ice application    [] other:      Other Objective/Functional Measures:    Progressed to yellow band with ankle PF/DF, EV/IV (see updated HEP)  Reviewed the importance of use of ankle brace in supportive shoe when going out of town this weekend for periods of prolonged standing/walking      Post Treatment Pain Level (on 0 to 10) scale:   0  / 10     ASSESSMENT  Assessment/Changes in Function:     Good tolerance to progression to gentle strengthening today, SLS continues to be difficult (although no c/o lateral ankle pain)     []  See Progress Note/Recertification   Patient will continue to benefit from skilled PT services to modify and progress therapeutic interventions, address functional mobility deficits, address ROM deficits, address strength deficits, analyze and address soft tissue restrictions, analyze and cue movement patterns, analyze and modify body mechanics/ergonomics, assess and modify postural abnormalities, address imbalance/dizziness, and instruct in home and community integration to attain remaining goals.    Progress toward goals / Updated goals:    Progressing towards LTG 3     PLAN  []  Upgrade activities as tolerated YES Continue plan of care   []  Discharge due to :    []  Other:      Therapist: Raz Blank PT    Date: 8/25/2022 Time: 8:19 AM     Future Appointments   Date Time Provider Dunia Allen   8/29/2022 12:00 PM [facility]on [facility]   8/31/2022 11:30 AM Rosemary Duran, PT [facility]   9/6/2022  7:15 AM Nicole Ramirez, PT [facility]   9/8/2022  7:15 AM Lance Beauchamp, PT [facility] no

## 2022-08-29 ENCOUNTER — HOSPITAL ENCOUNTER (OUTPATIENT)
Dept: PHYSICAL THERAPY | Age: 67
Discharge: HOME | End: 2022-08-29
Payer: MEDICARE

## 2022-08-29 PROCEDURE — 97110 THERAPEUTIC EXERCISES: CPT

## 2022-08-29 PROCEDURE — 97112 NEUROMUSCULAR REEDUCATION: CPT

## 2022-08-29 PROCEDURE — 97530 THERAPEUTIC ACTIVITIES: CPT

## 2022-08-29 NOTE — PROGRESS NOTES
PHYSICAL THERAPY - DAILY TREATMENT NOTE    Patient Name: Ariana Powell        Date: 2022  : 1955   YES Patient  Verified  Visit #:     Insurance: Payor: Jasmyn Brant / Plan: [place] MEDICARE PART A & B / Product Type: Medicare /      In time: 1200 Out time: 100   Total Treatment Time: 55     BCBS/Medicare Time Tracking (below)   Total Timed Codes (min):  45 1:1 Treatment Time:  40     TREATMENT AREA =  Left ankle pain [M25.572]    SUBJECTIVE  Pain Level (on 0 to 10 scale):  4  / 10   Medication Changes/New allergies or changes in medical history, any new surgeries or procedures?     NO    If yes, update Summary List   Subjective Functional Status/Changes:  []  No changes reported     Im more sore and swollen than normal but I was on my feet more over the weekend          Modalities Rationale:     decrease inflammation, decrease pain and increase tissue extensibility to improve patient's ability to perform ADLs   min [] Estim, type/location:                                     []  att     []  unatt     []  w/US     []  w/ice    []  w/heat    min []  Mechanical Traction: type/lbs                   []  pro   []  sup   []  int   []  cont    []  before manual    []  after manual    min []  Ultrasound, settings/location:      min []  Iontophoresis w/ dexamethasone, location:                                               []  take home patch       []  in clinic   10 min [x]  Ice     []  Heat    location/position: L ankle in supine, LE elevated    min []  Vasopneumatic Device, press/temp: If using vaso (only need to measure limb vaso being performed on)      pre-treatment girth :       post-treatment girth :       measured at (landmark location) :      min []  Other:    [x] Skin assessment post-treatment (if applicable):    [x]  intact    [x]  redness- no adverse reaction     []redness - adverse reaction:        20/15 min Therapeutic Exercise:  [x]  See flow sheet   Rationale:      increase ROM and increase strength to improve the patients ability to perform unlimted ADLs       10 min Therapeutic Activity: [x]  See flow sheet   Rationale:    increase ROM, increase strength, improve coordination, and mechanics to improve the patients ability to squat, negotiate stairs    15 min Neuromuscular Re-ed: [x]  See flow sheet   Rationale:    improve coordination, improve balance, and increase proprioception to improve the patients postural awareness, stability and motor control    Billed With/As:   [x] TE   [x] TA   [x] Neuro   [] Self Care Patient Education: [x] Review HEP    [] Progressed/Changed HEP based on:   [] positioning   [] body mechanics   [] transfers   [] heat/ice application    [] other:      Other Objective/Functional Measures:    See FS       Post Treatment Pain Level (on 0 to 10) scale:   3  / 10     ASSESSMENT  Assessment/Changes in Function:     Performed SLS on [place] board with use if 1 UE on HR. Cues to improve balance strategy, normalize distrubution and inc intrinsic recruitment during SLS. Cues to improve squat mechanics     []  See Progress Note/Recertification   Patient will continue to benefit from skilled PT services to modify and progress therapeutic interventions, address functional mobility deficits, address ROM deficits, address strength deficits, analyze and address soft tissue restrictions, analyze and cue movement patterns, analyze and modify body mechanics/ergonomics, assess and modify postural abnormalities, address imbalance/dizziness and instruct in home and community integration to attain remaining goals.    Progress toward goals / Updated goals:    Met STG     PLAN  [x]  Upgrade activities as tolerated YES Continue plan of care   []  Discharge due to :    []  Other:      Therapist: Fernanda Douglas PT, DPT, MTC, CMTPT    Date: 8/29/2022 Time: 6:34 PM     Future Appointments   Date Time Provider Dunia Allen   8/31/2022 11:30 AM Zeb Crane PT [facility]   9/6/2022  7:15 AM Aide Guzmán, PT [facility]   9/8/2022  7:15 AM Roman Beauchamp, PT [facility]

## 2022-08-31 ENCOUNTER — HOSPITAL ENCOUNTER (OUTPATIENT)
Dept: PHYSICAL THERAPY | Age: 67
Discharge: HOME | End: 2022-08-31
Payer: MEDICARE

## 2022-08-31 PROCEDURE — 97110 THERAPEUTIC EXERCISES: CPT

## 2022-08-31 PROCEDURE — 97140 MANUAL THERAPY 1/> REGIONS: CPT

## 2022-08-31 PROCEDURE — 97112 NEUROMUSCULAR REEDUCATION: CPT

## 2022-08-31 NOTE — PROGRESS NOTES
PHYSICAL THERAPY - DAILY TREATMENT NOTE     Patient Name: Serge Vazquez        Date: 2022  : 1955   YES Patient  Verified  Visit #:     Insurance: Payor: Sydnie Hazard / Plan: [place] MEDICARE PART A & B / Product Type: Medicare /      In time:  Out time:    Total Treatment Time: 50     Medicare/BCBS Time Tracking (below)   Total Timed Codes (min):  40 1:1 Treatment Time:  40     TREATMENT AREA =  Left ankle pain [M25.572]    SUBJECTIVE    Pain Level (on 0 to 10 scale):  1  / 10   Medication Changes/New allergies or changes in medical history, any new surgeries or procedures? NO    If yes, update Summary List   Subjective Functional Status/Changes:  []  No changes reported       Functional improvements: \"I feel better than I did on Monday and I think it is way less swollen. \"  Functional impairments: Decreased ROM, strength and ambulation, increased edema affecting ADL's         OBJECTIVE  Modalities Rationale:     decrease edema, decrease inflammation, and decrease pain to improve patient's ability to perform ADL's w/o pain      min [] Estim, type/location:                                      []  att     []  unatt     []  w/US     []  w/ice    []  w/heat    min []  Mechanical Traction: type/lbs                   []  pro   []  sup   []  int   []  cont    []  before manual    []  after manual    min []  Ultrasound, settings/location:      min []  Iontophoresis w/ dexamethasone, location:                                               []  take home patch       []  in clinic   10 min [x]  Ice     []  Heat    location/position: Supine to left ankle elevated    min []  Vasopneumatic Device, press/temp:  If using vaso (only need to measure limb vaso being performed on)      pre-treatment girth :       post-treatment girth :       measured at (landmark location) :       min []  Other:    [x] Skin assessment post-treatment (if applicable):    [x]  intact    [x]  redness- no adverse reaction []redness - adverse reaction:      15 min Therapeutic Exercise:  [x]  See flow sheet   Rationale:      increase ROM and increase strength to improve the patients ability to perform ADL's independently     15 min Neuromuscular Re-ed: [x]  See flow sheet   Rationale:      increase strength, improve coordination, improve balance, and increase proprioception to improve the patients ability to ambulate on even and uneven surfaces safely     10 min Manual Therapy: Technique:      [x] S/DTM []IASTM [x]PROM [x] Passive Stretching   []manual TPR    []Jt manipulation:Gr I [] II []  III [] IV[] V[]  Treatment Area: PROM all planes w/  STM for edema management    Rationale:      increase ROM, increase tissue extensibility, and decrease edema  to improve patient's ability to perform ADL's . The manual therapy interventions were performed at a separate and distinct time from the therapeutic activities interventions. Billed With/As:   [x] TE   [] TA   [x] Neuro   [] Self Care Patient Education: [x] Review HEP    [] Progressed/Changed HEP based on:   [] positioning   [] body mechanics   [] transfers   [] heat/ice application    [] other:        Other Objective/Functional Measures:    Per flow sheet  Fig 8 51 cm L (47.5 R)     Post Treatment Pain Level (on 0 to 10) scale:   0  / 10     ASSESSMENT    Assessment/Changes in Function:     Patient tolerated treatment session well. Notable improvement in edema compared to last session however continues to have some edema in left foot/ankle. Improvement noted in strength but continues to have decreased control with SLS, slight lateral pain w/ MOBE 1,3; improved with increased UE support.      []  See Progress Note/Recertification   Patient will continue to benefit from skilled PT services to modify and progress therapeutic interventions, address functional mobility deficits, address ROM deficits, address strength deficits, analyze and address soft tissue restrictions, analyze and cue movement patterns, analyze and modify body mechanics/ergonomics, and assess and modify postural abnormalities to attain remaining goals. Progress toward goals / Updated goals: All STG's met.  Good progress towards all LTG's     PLAN    [x]  Upgrade activities as tolerated YES Continue plan of care   []  Discharge due to :    []  Other:      Therapist: Lalito Alford PT    Date: 8/31/2022 Time: 8:15 AM     Future Appointments   Date Time Provider Dunia Allen   8/31/2022 11:30 AM Mert Estrada, PT [facility]   9/6/2022  7:15 AM Ligia Gill, PT [facility]   9/8/2022  7:15 AM Lake Beauchamp, PT [facility]

## 2022-09-06 ENCOUNTER — HOSPITAL ENCOUNTER (OUTPATIENT)
Dept: PHYSICAL THERAPY | Age: 67
Discharge: HOME | End: 2022-09-06
Payer: MEDICARE

## 2022-09-06 PROCEDURE — 97112 NEUROMUSCULAR REEDUCATION: CPT

## 2022-09-06 PROCEDURE — 97530 THERAPEUTIC ACTIVITIES: CPT

## 2022-09-06 PROCEDURE — 97110 THERAPEUTIC EXERCISES: CPT

## 2022-09-06 NOTE — PROGRESS NOTES
PHYSICAL THERAPY - DAILY TREATMENT NOTE    Patient Name: Keila Pearson        Date: 2022  : 1955   YES Patient  Verified  Visit #:     Insurance: Payor: Kurtis Vargas / Plan: [place] MEDICARE PART A & B / Product Type: Medicare /      In time: 7:15 A Out time: 8:10 A   Total Treatment Time: 50     BCBS/Medicare Time Tracking (below)   Total Timed Codes (min):  50 1:1 Treatment Time:  50     TREATMENT AREA =  Left ankle pain [M25.572]    SUBJECTIVE  Pain Level (on 0 to 10 scale):  1  / 10   Medication Changes/New allergies or changes in medical history, any new surgeries or procedures?     NO    If yes, update Summary List   Subjective Functional Status/Changes:  []  No changes reported     See progress note to MD            Modalities Rationale:    Patient deferred     min [] Estim, type/location:                                      []  att     []  unatt     []  w/US     []  w/ice    []  w/heat    min []  Mechanical Traction: type/lbs                   []  pro   []  sup   []  int   []  cont    []  before manual    []  after manual    min []  Ultrasound, settings/location:      min []  Iontophoresis w/ dexamethasone, location:                                               []  take home patch       []  in clinic    min []  Ice     []  Heat    location/position:     min []  Vasopneumatic Device, press/temp:    If using vaso (only need to measure limb vaso being performed on)      pre-treatment girth :       post-treatment girth :       measured at (landmark location) :      min []  Other:    [] Skin assessment post-treatment (if applicable):    []  intact    []  redness- no adverse reaction                  []redness - adverse reaction:        20 min Therapeutic Exercise:  [x]  See flow sheet   Rationale:      increase ROM and increase strength to improve the patients ability to tolerate prolonged ambulation      15 min Therapeutic Activity: [x]  See flow sheet   Rationale:    increase ROM, increase strength, improve balance, and increase proprioception to improve the patients ability to squat with = weightbearing    15 min Neuromuscular Re-ed: [x]  See flow sheet   Rationale:    improve coordination, improve balance, and increase proprioception to improve the patients ability to single leg activities, such as curb negotiation      Billed With/As:   [] TE   [] TA   [] Neuro   [] Self Care Patient Education: [x] Review HEP    [] Progressed/Changed HEP based on:   [] positioning   [] body mechanics   [] transfers   [] heat/ice application    [] other:      Other Objective/Functional Measures:    AROM/PROM: DF see progress note  SLS 7 seconds  MMT: PF 2/5, IV 4/5, IV 4 to 4+/5, DF 5-/5  Mild pain with passive overpressure IV     Post Treatment Pain Level (on 0 to 10) scale:   0  / 10     ASSESSMENT  Assessment/Changes in Function:     Steady progress with strength & balance     []  See Progress Note/Recertification   Patient will continue to benefit from skilled PT services to modify and progress therapeutic interventions, address functional mobility deficits, address ROM deficits, address strength deficits, analyze and address soft tissue restrictions, analyze and cue movement patterns, analyze and modify body mechanics/ergonomics, assess and modify postural abnormalities, address imbalance/dizziness, and instruct in home and community integration to attain remaining goals.    Progress toward goals / Updated goals:    See progress note for progress with goals      PLAN  []  Upgrade activities as tolerated YES Continue plan of care   []  Discharge due to :    []  Other:      Therapist: Charlotte Graves PT    Date: 9/6/2022 Time: 1:40 PM     Future Appointments   Date Time Provider Dunia Allen   9/8/2022  7:15 AM Saundra Grove PT [facility]   9/12/2022 11:45 AM ANDREA CrabtreePTNAEL [facility]   9/16/2022 11:45 AM ANDREA CrabtreePTNAEL [facility]   9/19/2022  5:15 PM Birder Fothergill, Durene Llanos, PT [facility]   9/26/2022  7:15 AM Rebel Prather, PT [facility]   9/29/2022  7:15 AM Melissa Beauchamp, PT [facility]

## 2022-09-06 NOTE — PROGRESS NOTES
[address] [place] [address] PHYSICAL THERAPY AT [address] [place] [address] [facility], [address] [place] [address], [address]  Phone: [phone]  Fax: [phone] OF CARE/RECERTIFICATION FOR PHYSICAL THERAPY          Patient Name: Darwyn Brittle : 1955   Treatment/Medical Diagnosis: Left ankle pain [M25.572]   Onset Date: 22    Referral Source: Amaya Gomez MD [facility]): 22   Prior Hospitalization: See Medical History Provider #: [id number]   Prior Level of Function: Indep with ambulation and ADLS   Comorbidities: Hx of hepatitis   Medications: Verified on Patient Summary List   Visits from [facility]: 8 Missed Visits: 0     Goal/Measure of Progress Goal Met? 1. Patient will be compliant with initial HEP for sx management to address the above listed deficits. Status at last Eval: NA Current Status: Compliant with HEP  yes   2. Patient will have decreased swelling to 57 cm (figure 8) to facilitate improved functional mobility end endurance and progression out of boot and into brace. Status at last Eval: 59.5 cm Current Status: 52.5 yes   3. Patient to increase FOTO score to 68 indicating improved functional abilities and QOL. Status at last Eval: 58 Current Status: 58 progressing   4. Patient to increase DF of L ankle to 10 deg to facilitate normal gait and stairs. Status at last Eval: 3 degrees Current Status: AROM DF: 5 deg  PROM PF: 8 deg progressing     Key Functional Changes/Progress: Mrs. Scarlet Prader continues to make slow but steady progress with PT, she continues to report fairly constant of aching/tightness in L ankle, intermittent c/o pain which she is taking oxy on a prn basis to manage. Primary c/o pain first thing in the morning & with progressive weightbearing activities.  She reports fair compliance with use of stability ankle brace in sneaker & continues to have weakness in L ankle with single leg activities with treatment as well as prolonged ambulation. Tolerance to progressive balance & strengthening continues to improve. Problem List: pain affecting function, decrease ROM, decrease strength, edema affecting function, impaired gait/ balance, decrease ADL/ functional abilitiies, decrease activity tolerance, decrease flexibility/ joint mobility, and decrease transfer abilities   Treatment Plan may include any combination of the following: Therapeutic exercise, Therapeutic activities, Neuromuscular re-education, Physical agent/modality, Gait/balance training, Manual therapy, Patient education, Self Care training, Functional mobility training, Home safety training, and Stair training  Patient Goal(s) has been updated and includes:  Walk without brace, increase strength   Goals for this certification period include and are to be achieved in   3-4  weeks:  Patient to increase FOTO score to 68 indicating improved functional abilities and QOL. Patient to increase strength in L ankle globally  to 5 to facilitate prolonged standing and walking endurance. Patient to increase DF of L ankle to 10 deg to facilitate normal gait and stairs. Patient to increase  SLS to 15 sec for negotiation of obstacles and reduced risk of falls  Frequency / Duration:   Patient to be seen   2   times per week for   4    weeks:    Assessments/Recommendations: Patient can benefit from continued PT interventions in order to progress towards achieving all LTGs. If you have any questions/comments please contact us directly at [phone]. Thank you for allowing us to assist in the care of your patient. Therapist Signature: LITA Mccain, cert MDT Date: 1/0/2905   Certification Period:  Reporting Period: 9-06-22 to 12-04-22 8-12-22 to 9-06-22  Time: 7:28 AM   NOTE TO PHYSICIAN:  PLEASE COMPLETE THE ORDERS BELOW AND FAX TO   [facility]: [phone]   If you are unable to process this request in 24 hours please contact our office: [phone].    ___ I have read the above report and request that my patient continue as recommended.   ___ I have read the above report and request that my patient continue therapy with the following changes/special instructions: ________________________________________________   ___ I have read the above report and request that my patient be discharged from therapy.      Physician Signature:                                                             Date:                                     Time:                                                                       Karin Wallace MD

## 2022-09-08 ENCOUNTER — HOSPITAL ENCOUNTER (OUTPATIENT)
Dept: PHYSICAL THERAPY | Age: 67
Discharge: HOME | End: 2022-09-08
Payer: MEDICARE

## 2022-09-08 PROCEDURE — 97110 THERAPEUTIC EXERCISES: CPT

## 2022-09-08 PROCEDURE — 97112 NEUROMUSCULAR REEDUCATION: CPT

## 2022-09-08 PROCEDURE — 97530 THERAPEUTIC ACTIVITIES: CPT

## 2022-09-08 NOTE — PROGRESS NOTES
PHYSICAL THERAPY - DAILY TREATMENT NOTE    Patient Name: Brock Hernandez        Date: 2022  : 1955   YES Patient  Verified  Visit #:     Insurance: Payor: Flori Pedersenors / Plan: [place] MED[place] PART A & B / Product Type: Medicare /      In time: 7:15 A Out time: 8:10 A   Total Treatment Time: 55     BCBS/Medicare Time Tracking (below)   Total Timed Codes (min):  45 1:1 Treatment Time:  45     TREATMENT AREA =  Left ankle pain [M25.572]    SUBJECTIVE  Pain Level (on 0 to 10 scale):  1  / 10   Medication Changes/New allergies or changes in medical history, any new surgeries or procedures? NO    If yes, update Summary List   Subjective Functional Status/Changes:  []  No changes reported     Patient reports she still has some pain on the outside of her ankle, she is more stiff in the morning but swelling has not been as bad this week.             Modalities Rationale:     decrease edema, decrease inflammation, and decrease pain to improve patient's ability to return to pain-free ADLs    min [] Estim, type/location:                                      []  att     []  unatt     []  w/US     []  w/ice    []  w/heat    min []  Mechanical Traction: type/lbs                   []  pro   []  sup   []  int   []  cont    []  before manual    []  after manual    min []  Ultrasound, settings/location:      min []  Iontophoresis w/ dexamethasone, location:                                               []  take home patch       []  in clinic   10 min [x]  Ice     []  Heat    location/position: Supine to L ankle    min []  Vasopneumatic Device, press/temp:    If using vaso (only need to measure limb vaso being performed on)      pre-treatment girth :       post-treatment girth :       measured at (landmark location) :      min []  Other:    [] Skin assessment post-treatment (if applicable):    [x]  intact    15  redness- no adverse reaction                  []redness - adverse reaction:        15 min Therapeutic Exercise:  [x]  See flow sheet   Rationale:      increase ROM and increase strength to improve the patients ability to return to pain-free standing     15 min Therapeutic Activity: [x]  See flow sheet   Rationale:    increase ROM, increase strength, improve coordination, improve balance, and increase proprioception to improve the patients ability to tolerate lifting when babysitting her grand daughter    15 min Neuromuscular Re-ed: [x]  See flow sheet   Rationale:    improve coordination, improve balance, and increase proprioception to improve the patients ability to return to curb negotiation       Billed With/As:   [] TE   [] TA   [] Neuro   [] Self Care Patient Education: [x] Review HEP    [] Progressed/Changed HEP based on:   [] positioning   [] body mechanics   [] transfers   [] heat/ice application    [] other:      Other Objective/Functional Measures:    Progressed to red band with ankle theraband, see below      Post Treatment Pain Level (on 0 to 10) scale:   0  / 10     ASSESSMENT  Assessment/Changes in Function:     Unable to tolerate heel raise in standing without increase in lateral ankle pain (no c/o pain with toe raises in standing), improved tolerance to heel raise in sitting, increased difficulty with 1, 3 planes of motion with [address] board     []  See Progress Note/Recertification   Patient will continue to benefit from skilled PT services to modify and progress therapeutic interventions, address functional mobility deficits, address ROM deficits, address strength deficits, analyze and address soft tissue restrictions, analyze and cue movement patterns, analyze and modify body mechanics/ergonomics, assess and modify postural abnormalities, address imbalance/dizziness, and instruct in home and community integration to attain remaining goals.    Progress toward goals / Updated goals:    Progressing towards achieving newly established LTGs     PLAN  []  Upgrade activities as tolerated YES Continue plan of care   []  Discharge due to :    []  Other:      Therapist: Joce Villegas PT    Date: 9/8/2022 Time: 10:58 AM     Future Appointments   Date Time Provider Dunia Allen   9/12/2022 11:45 AM Amrita Menchaca, PT [facility]   9/16/2022 11:45 AM Amrita Menchaca PT [facility]   9/19/2022  5:15 PM Bill Diaz, PT [facility]   9/26/2022  7:15 AM Amrita Menchaca PT [facility]   9/29/2022  7:15 AM Ryder Beauchamp, PT [facility]

## 2022-09-12 ENCOUNTER — HOSPITAL ENCOUNTER (OUTPATIENT)
Dept: PHYSICAL THERAPY | Age: 67
Discharge: HOME | End: 2022-09-12
Payer: MEDICARE

## 2022-09-12 PROCEDURE — 97530 THERAPEUTIC ACTIVITIES: CPT

## 2022-09-12 PROCEDURE — 97110 THERAPEUTIC EXERCISES: CPT

## 2022-09-12 PROCEDURE — 97112 NEUROMUSCULAR REEDUCATION: CPT

## 2022-09-12 NOTE — PROGRESS NOTES
PHYSICAL THERAPY - DAILY TREATMENT NOTE    Patient Name: Latisha Madrigal        Date: 2022  : 1955   YES Patient  Verified  Visit #:   10   of   12  Insurance: Payor: Belkys Megan / Plan: [place] MEDICARE PART A & B / Product Type: Medicare /      In time: 11:45 A Out time: 12:40 P   Total Treatment Time: 55     BCBS/Medicare Time Tracking (below)   Total Timed Codes (min):  45 1:1 Treatment Time:  45     TREATMENT AREA =  Left ankle pain [M25.572]    SUBJECTIVE  Pain Level (on 0 to 10 scale):  0  / 10   Medication Changes/New allergies or changes in medical history, any new surgeries or procedures? NO    If yes, update Summary List   Subjective Functional Status/Changes:  []  No changes reported     Patient reports she will be going to [address] she will be going to a dinner & has to be wear nicer shoes & she cannot get her L ankle into many shoes because of swelling & pain.            Modalities Rationale:     decrease edema, decrease inflammation, and decrease pain to improve patient's ability to return to pain-free ADLs   min [] Estim, type/location:                                      []  att     []  unatt     []  w/US     []  w/ice    []  w/heat    min []  Mechanical Traction: type/lbs                   []  pro   []  sup   []  int   []  cont    []  before manual    []  after manual    min []  Ultrasound, settings/location:      min []  Iontophoresis w/ dexamethasone, location:                                               []  take home patch       []  in clinic   10 min [x]  Ice     []  Heat    location/position: Supine to L ankle     min []  Vasopneumatic Device, press/temp:    If using vaso (only need to measure limb vaso being performed on)      pre-treatment girth :       post-treatment girth :       measured at (landmark location) :      min []  Other:    [] Skin assessment post-treatment (if applicable):    [x]  intact    [x]  redness- no adverse reaction                  []redness - adverse reaction:        15 min Therapeutic Exercise:  [x]  See flow sheet   Rationale:      increase ROM and increase strength to improve the patients ability to return to pain-free ambulation     15 min Therapeutic Activity: [x]  See flow sheet   Rationale:    improve coordination, improve balance, and increase proprioception to improve the patients ability to return to pain-free squatting    15 min Neuromuscular Re-ed: [x]  See flow sheet   Rationale:    improve coordination, improve balance, and increase proprioception to improve the patients ability to return to ADLs with decreased fall risk       Billed With/As:   [] TE   [] TA   [] Neuro   [] Self Care Patient Education: [x] Review HEP    [] Progressed/Changed HEP based on:   [] positioning   [] body mechanics   [] transfers   [] heat/ice application    [] other:      Other Objective/Functional Measures:    Progressed balance exercises to mSR on foam (heel to great toe)     Post Treatment Pain Level (on 0 to 10) scale:   0  / 10     ASSESSMENT  Assessment/Changes in Function:     Improved tolerance to attempts at standing heel raise today     []  See Progress Note/Recertification   Patient will continue to benefit from skilled PT services to modify and progress therapeutic interventions, address functional mobility deficits, address ROM deficits, address strength deficits, analyze and address soft tissue restrictions, analyze and cue movement patterns, analyze and modify body mechanics/ergonomics, assess and modify postural abnormalities, and instruct in home and community integration to attain remaining goals.    Progress toward goals / Updated goals:    Progressing towards LTG 1, 2     PLAN  []  Upgrade activities as tolerated YES Continue plan of care   []  Discharge due to :    []  Other:      Therapist: Arline Horn PT    Date: 9/12/2022 Time: 12:55 PM     Future Appointments   Date Time Provider Dunia Allen   9/16/2022 11:45 AM Nito Maria Elena [facility]   9/19/2022  5:15 PM Mejia Garcia, PT [facility]   9/26/2022  7:15 AM Scot Chauhan, PT [facility]   9/29/2022  7:15 AM Waldo Beauchamp, PT [facility]

## 2022-09-12 NOTE — PROGRESS NOTES
PHYSICAL THERAPY - DAILY TREATMENT NOTE    Patient Name: Brock Hernandez        Date: 2022  : 1955   YES Patient  Verified  Visit #:   10   of   12  Insurance: Payor: Flori Gallo / Plan: [place] MEDICARE PART A & B / Product Type: Medicare /      In time: 11:45 A Out time: 12:40 P   Total Treatment Time: 55     BCBS/Medicare Time Tracking (below)   Total Timed Codes (min):  45 1:1 Treatment Time:  45     TREATMENT AREA =  Left ankle pain [M25.572]    SUBJECTIVE  Pain Level (on 0 to 10 scale):  0  / 10   Medication Changes/New allergies or changes in medical history, any new surgeries or procedures? NO    If yes, update Summary List   Subjective Functional Status/Changes:  []  No changes reported     Patient reports she will be going to [address] she will be going to a dinner & has to be wear nicer shoes & she cannot get her L ankle into many shoes because of swelling & pain.            Modalities Rationale:     decrease edema, decrease inflammation, and decrease pain to improve patient's ability to return to pain-free ADLs   min [] Estim, type/location:                                      []  att     []  unatt     []  w/US     []  w/ice    []  w/heat    min []  Mechanical Traction: type/lbs                   []  pro   []  sup   []  int   []  cont    []  before manual    []  after manual    min []  Ultrasound, settings/location:      min []  Iontophoresis w/ dexamethasone, location:                                               []  take home patch       []  in clinic   10 min [x]  Ice     []  Heat    location/position: Supine to L ankle     min []  Vasopneumatic Device, press/temp:    If using vaso (only need to measure limb vaso being performed on)      pre-treatment girth :       post-treatment girth :       measured at (landmark location) :      min []  Other:    [] Skin assessment post-treatment (if applicable):    [x]  intact    [x]  redness- no adverse reaction                  []redness - adverse reaction:        15 min Therapeutic Exercise:  [x]  See flow sheet   Rationale:      increase ROM and increase strength to improve the patients ability to return to pain-free ambulation     15 min Therapeutic Activity: [x]  See flow sheet   Rationale:    improve coordination, improve balance, and increase proprioception to improve the patients ability to return to pain-free squatting    15 min Neuromuscular Re-ed: [x]  See flow sheet   Rationale:    improve coordination, improve balance, and increase proprioception to improve the patients ability to return to ADLs with decreased fall risk       Billed With/As:   [] TE   [] TA   [] Neuro   [] Self Care Patient Education: [x] Review HEP    [] Progressed/Changed HEP based on:   [] positioning   [] body mechanics   [] transfers   [] heat/ice application    [] other:      Other Objective/Functional Measures:    Progressed balance exercises to mSR on foam (heel to great toe)     Post Treatment Pain Level (on 0 to 10) scale:   0  / 10     ASSESSMENT  Assessment/Changes in Function:     Improved tolerance to attempts at standing heel raise today     []  See Progress Note/Recertification   Patient will continue to benefit from skilled PT services to modify and progress therapeutic interventions, address functional mobility deficits, address ROM deficits, address strength deficits, analyze and address soft tissue restrictions, analyze and cue movement patterns, analyze and modify body mechanics/ergonomics, assess and modify postural abnormalities, and instruct in home and community integration to attain remaining goals.    Progress toward goals / Updated goals:    Progressing towards LTG 1, 2     PLAN  []  Upgrade activities as tolerated YES Continue plan of care   []  Discharge due to :    []  Other:      Therapist: Venkata Patrick PT    Date: 9/12/2022 Time: 12:55 PM     Future Appointments   Date Time Provider Dunia Allen   9/16/2022 11:45 AM Nito Kirk [facility] [place] [facility]   9/19/2022  5:15 PM Matilde Asif, PT [facility]   9/26/2022  7:15 AM Susan Alfaro, PT [facility]   9/29/2022  7:15 AM Stefany Beauchamp, PT [facility]

## 2022-09-16 ENCOUNTER — HOSPITAL ENCOUNTER (OUTPATIENT)
Dept: PHYSICAL THERAPY | Age: 67
Discharge: HOME | End: 2022-09-16
Payer: MEDICARE

## 2022-09-16 PROCEDURE — 97112 NEUROMUSCULAR REEDUCATION: CPT

## 2022-09-16 PROCEDURE — 97110 THERAPEUTIC EXERCISES: CPT

## 2022-09-16 PROCEDURE — 97530 THERAPEUTIC ACTIVITIES: CPT

## 2022-09-16 NOTE — PROGRESS NOTES
PHYSICAL THERAPY - DAILY TREATMENT NOTE    Patient Name: Vineet Romero        Date: 2022  : 1955   YES Patient  Verified  Visit #:     Insurance: Payor: Saniya Altman / Plan: [place] MEDICARE PART A & B / Product Type: Medicare /      In time: 11:45 A Out time: 12:45 P   Total Treatment Time: 55     BCBS/Medicare Time Tracking (below)   Total Timed Codes (min):  45 1:1 Treatment Time:  45     TREATMENT AREA =  Left ankle pain [M25.572]    SUBJECTIVE  Pain Level (on 0 to 10 scale):  0  / 10   Medication Changes/New allergies or changes in medical history, any new surgeries or procedures? NO    If yes, update Summary List   Subjective Functional Status/Changes:  []  No changes reported     Patient reports she did well when she was out of town, she did a lot of walking.  Her ankle is swollen today because she has been on her feet            Modalities Rationale:     decrease edema, decrease inflammation, and decrease pain to improve patient's ability to return to pain-free ADLs    min [] Estim, type/location:                                      []  att     []  unatt     []  w/US     []  w/ice    []  w/heat    min []  Mechanical Traction: type/lbs                   []  pro   []  sup   []  int   []  cont    []  before manual    []  after manual    min []  Ultrasound, settings/location:      min []  Iontophoresis w/ dexamethasone, location:                                               []  take home patch       []  in clinic   10 min [x]  Ice     []  Heat    location/position: Supine to L ankle    min []  Vasopneumatic Device, press/temp:    If using vaso (only need to measure limb vaso being performed on)      pre-treatment girth :       post-treatment girth :       measured at (landmark location) :      min []  Other:    [] Skin assessment post-treatment (if applicable):    [x]  intact    [x]  redness- no adverse reaction                  []redness - adverse reaction:        15 min Therapeutic Exercise:  [x]  See flow sheet   Rationale:      increase ROM, increase strength, improve balance, and increase proprioception to improve the patients ability to return to normalized gait     15 min Therapeutic Activity: [x]  See flow sheet   Rationale:    improve coordination, improve balance, and increase proprioception to improve the patients ability to return to stair negotiation    15 min Neuromuscular Re-ed: [x]  See flow sheet   Rationale:    improve coordination, improve balance, and increase proprioception to improve the patients ability to tolerate ADLs with decreased fall risk      Billed With/As:   [] TE   [] TA   [] Neuro   [] Self Care Patient Education: [x] Review HEP    [] Progressed/Changed HEP based on:   [] positioning   [] body mechanics   [] transfers   [] heat/ice application    [] other:      Other Objective/Functional Measures:    Progressed to SR on foam today, added step ups     Post Treatment Pain Level (on 0 to 10) scale:   0  / 10     ASSESSMENT  Assessment/Changes in Function:     Patient unable to tolerate ankle PF in standing and eccentric lowering in standing without increase in lateral ankle pain,  limited by pain & weakness      []  See Progress Note/Recertification   Patient will continue to benefit from skilled PT services to modify and progress therapeutic interventions, address functional mobility deficits, address ROM deficits, address strength deficits, analyze and address soft tissue restrictions, analyze and cue movement patterns, analyze and modify body mechanics/ergonomics, assess and modify postural abnormalities, address imbalance/dizziness, and instruct in home and community integration to attain remaining goals.    Progress toward goals / Updated goals:    Progressing towards LTG 2, 4     PLAN  []  Upgrade activities as tolerated YES Continue plan of care   []  Discharge due to :    []  Other:      Therapist: Thao Rose, PT    Date: 9/16/2022 Time: 12:07 PM     Future Appointments   Date Time Provider Dunia Allen   9/19/2022  5:15 PM Jack Tena, PT [facility]   9/26/2022  7:15 AM Jody Ryan, PT [facility]   9/29/2022  7:15 AM Vito-Cruz, Rudean Phalen, PT [facility]

## 2022-09-19 ENCOUNTER — APPOINTMENT (OUTPATIENT)
Dept: PHYSICAL THERAPY | Age: 67
End: 2022-09-19
Payer: MEDICARE

## 2022-09-22 ENCOUNTER — APPOINTMENT (OUTPATIENT)
Dept: PHYSICAL THERAPY | Age: 67
End: 2022-09-22
Payer: MEDICARE

## 2022-09-26 ENCOUNTER — HOSPITAL ENCOUNTER (OUTPATIENT)
Dept: PHYSICAL THERAPY | Age: 67
Discharge: HOME | End: 2022-09-26
Payer: MEDICARE

## 2022-09-26 PROCEDURE — 97530 THERAPEUTIC ACTIVITIES: CPT

## 2022-09-26 PROCEDURE — 97110 THERAPEUTIC EXERCISES: CPT

## 2022-09-26 PROCEDURE — 97112 NEUROMUSCULAR REEDUCATION: CPT

## 2022-09-26 NOTE — PROGRESS NOTES
PHYSICAL THERAPY - DAILY TREATMENT NOTE    Patient Name: Davida Shannon        Date: 2022  : 1955   YES Patient  Verified  Visit #:     Insurance: Payor: Tita Hoffman / Plan: [place] MEDICARE PART A & B / Product Type: Medicare /      In time: 7:15 A Out time: 8:10 A   Total Treatment Time: 55     BCBS/Medicare Time Tracking (below)   Total Timed Codes (min):  45 1:1 Treatment Time:  45     TREATMENT AREA =  Left ankle pain [M25.572]    SUBJECTIVE  Pain Level (on 0 to 10 scale):  3  / 10   Medication Changes/New allergies or changes in medical history, any new surgeries or procedures?     NO    If yes, update Summary List   Subjective Functional Status/Changes:  []  No changes reported     See progress note to MD           Modalities Rationale:     decrease edema, decrease inflammation, and decrease pain to improve patient's ability to return to pain-free ADLs   min [] Estim, type/location:                                      []  att     []  unatt     []  w/US     []  w/ice    []  w/heat    min []  Mechanical Traction: type/lbs                   []  pro   []  sup   []  int   []  cont    []  before manual    []  after manual    min []  Ultrasound, settings/location:      min []  Iontophoresis w/ dexamethasone, location:                                               []  take home patch       []  in clinic   10 min [x]  Ice     []  Heat    location/position: Supine elevated to L ankle    min []  Vasopneumatic Device, press/temp:    If using vaso (only need to measure limb vaso being performed on)      pre-treatment girth :       post-treatment girth :       measured at (landmark location) :      min []  Other:    [] Skin assessment post-treatment (if applicable):    [x]  intact    [x]  redness- no adverse reaction                  []redness - adverse reaction:        15 min Therapeutic Exercise:  [x]  See flow sheet   Rationale:      increase ROM and increase strength to improve the patients ability to return to prolonged ambulation      15 min Therapeutic Activity: [x]  See flow sheet   Rationale:    increase strength, improve coordination, improve balance, and increase proprioception to improve the patients ability to previous walking program for exercise    15 min Neuromuscular Re-ed: [x]  See flow sheet   Rationale:    improve coordination, improve balance, and increase proprioception to improve the patients ability to return to safe ADLs with decreased fall risk       Billed With/As:   [] TE   [] TA   [] Neuro   [] Self Care Patient Education: [x] Review HEP    [] Progressed/Changed HEP based on:   [] positioning   [] body mechanics   [] transfers   [] heat/ice application    [] other:      Other Objective/Functional Measures:    FOTO no change  MMT see progress note     Post Treatment Pain Level (on 0 to 10) scale:   2 / 10     ASSESSMENT  Assessment/Changes in Function:     Mild c/o lateral ankle pain with resisted ankle PF as well as EV, SLS slowly improving      []  See Progress Note/Recertification   Patient will continue to benefit from skilled PT services to modify and progress therapeutic interventions, address functional mobility deficits, address ROM deficits, address strength deficits, analyze and address soft tissue restrictions, analyze and cue movement patterns, analyze and modify body mechanics/ergonomics, assess and modify postural abnormalities, address imbalance/dizziness, and instruct in home and community integration to attain remaining goals.    Progress toward goals / Updated goals:    LTG 3 met, LTG 4 partially met     PLAN  []  Upgrade activities as tolerated YES Continue plan of care   []  Discharge due to :    [x]  Other: Progress note to MD, follow up with MD on Friday     Therapist: Suly Barrientos PT    Date: 9/26/2022 Time: 7:48 AM     Future Appointments   Date Time Provider Dunia Allen   9/29/2022  7:15 AM Sterling Beauchamp, PT [facility]

## 2022-09-26 NOTE — PROGRESS NOTES
[address] [place] [address] PHYSICAL THERAPY AT [address] [place] [address] Taz Plass 01, [address] [place] [address], [address]  Phone: [phone]  Fax: [phone] OF CARE/RECERTIFICATION FOR PHYSICAL THERAPY          Patient Name: Vineet Romero : 1955   Treatment/Medical Diagnosis: Left ankle pain [M25.572]   Onset Date: 22    Referral Source: Victor Manuel Hutson MD Start of [facility]): 22   Prior Hospitalization: See Medical History Provider #: [id number]   Prior Level of Function: Indep with ambulation and ADLS   Comorbidities: Hx of hepatitis   Medications: Verified on Patient Summary List   Visits from [facility]: 12 Missed Visits: 0     Goal/Measure of Progress Goal Met? 1. Patient to increase FOTO score to 68 indicating improved functional abilities and QOL. Status at last Eval: 58 Current Status: 58  no   2. Patient to increase strength in L ankle globally  to 5 to facilitate prolonged standing and walking endurance. Status at last Eval: Limited Current Status: PF 2/5  DF 5-/5  IV 4 to 4+/5  EV 4/5 progressing   3. Patient to increase  SLS to 15 sec for negotiation of obstacles and reduced risk of falls   Status at last Eval: Unable Current Status: 10 secs progressing   4. Patient to increase DF of L ankle to 10 deg to facilitate normal gait and stairs. Status at last Eval: AROM DF: 5 deg  PROM PF: 8 deg Current Status: AROM DF: 8 deg  PROM PF: 10 deg (grossly = to R ankle) yes     Key Functional Changes/Progress: Mrs. Geetha Campa continues to make slow but steady progress with PT, she continues to report fairly constant of aching/tightness in L ankle, intermittent c/o pain with average pain level at 3/10, at worst 6-7/10. She continues to report primary c/o lateral ankle pain as well as moderate signs of edema with progressive weightbearing.   Improved compliance with use ankle brace although poor compliance with use of supportive shoe wear such as sneaker for support. Problem List: pain affecting function, decrease ROM, decrease strength, edema affecting function, impaired gait/ balance, decrease ADL/ functional abilitiies, decrease activity tolerance, decrease flexibility/ joint mobility, and decrease transfer abilities   Treatment Plan may include any combination of the following: Therapeutic exercise, Therapeutic activities, Neuromuscular re-education, Physical agent/modality, Gait/balance training, Manual therapy, Patient education, Self Care training, Functional mobility training, Home safety training, and Stair training  Patient Goal(s) has been updated and includes:  Walk without brace, increase strength   Goals for this certification period include and are to be achieved in   3-4  weeks:  Patient to increase FOTO score to 68 indicating improved functional abilities and QOL. Patient to increase strength in L ankle globally  to 5 to facilitate prolonged standing and walking endurance. Patient to increase  SLS to 15 sec for negotiation of obstacles and reduced risk of falls  Patient to report 50% improvement in overall function in preparation for return to recreational activities with manageable sx in L ankle. Frequency / Duration:   Patient to be seen   2   times per week for   3-4    weeks:    Assessments/Recommendations: Patient can benefit from continued PT interventions in order to progress towards achieving all LTGs. If you have any questions/comments please contact us directly at [phone]. Thank you for allowing us to assist in the care of your patient. Therapist Signature: LITA Dobbins, cert MDT Date: 6/55/3113   Certification Period:  Reporting Period: 9-26-22 to 12-24-22 9-06-22 to 9-26-22  Time: 7:28 AM   NOTE TO PHYSICIAN:  PLEASE COMPLETE THE ORDERS BELOW AND FAX TO   [facility] [place] [facility]: [phone].   If you are unable to process this request in 24 hours please contact our office: [phone].    ___ I have read the above report and request that my patient continue as recommended.   ___ I have read the above report and request that my patient continue therapy with the following changes/special instructions: ________________________________________________   ___ I have read the above report and request that my patient be discharged from therapy.      Physician Signature:                                                             Date:                                     Time:                                                                       Lauren Miller MD

## 2022-09-29 ENCOUNTER — HOSPITAL ENCOUNTER (OUTPATIENT)
Dept: PHYSICAL THERAPY | Age: 67
Discharge: HOME | End: 2022-09-29
Payer: MEDICARE

## 2022-09-29 PROCEDURE — 97110 THERAPEUTIC EXERCISES: CPT

## 2022-09-29 PROCEDURE — 97112 NEUROMUSCULAR REEDUCATION: CPT

## 2022-09-29 PROCEDURE — 97530 THERAPEUTIC ACTIVITIES: CPT

## 2022-09-29 NOTE — PROGRESS NOTES
PHYSICAL THERAPY - DAILY TREATMENT NOTE    Patient Name: Karie Roth        Date: 2022  : 1955   YES Patient  Verified  Visit #:   15   of   13  Insurance: Payor: Willian Sutton / Plan: [place] MEDICARE PART A & B / Product Type: Medicare /      In time: 7:15 A Out time: 8:15 A   Total Treatment Time: 55     BCBS/Medicare Time Tracking (below)   Total Timed Codes (min):  45 1:1 Treatment Time:  45     TREATMENT AREA =  Left ankle pain [M25.572]  SUBJECTIVE  Pain Level (on 0 to 10 scale):  1  / 10   Medication Changes/New allergies or changes in medical history, any new surgeries or procedures? NO    If yes, update Summary List   Subjective Functional Status/Changes:  []  No changes reported     Patient reports she has been more consistent with taking the ibuprofen & she feels better when she is wearing brace but she has a hard time getting it into her shoe.            Modalities Rationale:     decrease edema, decrease inflammation, and decrease pain to improve patient's ability to return to pain-free ADLs   min [] Estim, type/location:                                      []  att     []  unatt     []  w/US     []  w/ice    []  w/heat    min []  Mechanical Traction: type/lbs                   []  pro   []  sup   []  int   []  cont    []  before manual    []  after manual    min []  Ultrasound, settings/location:      min []  Iontophoresis w/ dexamethasone, location:                                               []  take home patch       []  in clinic   10 min [x]  Ice     []  Heat    location/position: Supine to L ankle    min []  Vasopneumatic Device, press/temp:    If using vaso (only need to measure limb vaso being performed on)      pre-treatment girth :       post-treatment girth :       measured at (landmark location) :      min []  Other:    [] Skin assessment post-treatment (if applicable):    [x]  intact    [x]  redness- no adverse reaction                  []redness - adverse reaction: 15 min Therapeutic Exercise:  [x]  See flow sheet   Rationale:      increase ROM and increase strength to improve the patients ability to return to pain-free walking program     15 min Therapeutic Activity: [x]  See flow sheet   Rationale:    increase ROM, increase strength, improve balance, and increase proprioception to improve the patients ability to negotiate stairs leading with L LE    15 min Neuromuscular Re-ed: [x]  See flow sheet   Rationale:    improve balance and increase proprioception to improve the patients ability to perform single leg activities such as curb negotiation        Billed With/As:   [] TE   [] TA   [] Neuro   [] Self Care Patient Education: [x] Review HEP    [] Progressed/Changed HEP based on:   [] positioning   [] body mechanics   [] transfers   [] heat/ice application    [] other:      Other Objective/Functional Measures: Added step ups on 6 inch step (see updated HEP), ankle theraband PF (with knee in slight flexion, heel on ground)     Post Treatment Pain Level (on 0 to 10) scale:   1  / 10     ASSESSMENT  Assessment/Changes in Function:     Good tolerance to current program, reviewed updated HEP, patient in agreement with being placed on hold, to continue with daily HEP & will follow up prn     []  See Progress Note/Recertification   Patient will continue to benefit from skilled PT services to analyze and address soft tissue restrictions, analyze and cue movement patterns, analyze and modify body mechanics/ergonomics, assess and modify postural abnormalities, address imbalance/dizziness, and instruct in home and community integration to attain remaining goals.    Progress toward goals / Updated goals:    Progressing towards newly established LTGs      PLAN  []  Upgrade activities as tolerated YES Continue plan of care   []  Discharge due to :    [x]  Other: Patient to be placed on hold      Therapist: Milli Walker PT    Date: 9/29/2022 Time: 8:08 AM     Future Appointments   Date Time Provider Dunia Allen   10/3/2022  7:00 AM Marcio Rader, PT [facility]   10/6/2022  7:20 AM Marcio Rader, PT [facility]   10/10/2022  7:00 AM Marcio Rader, PT [facility]   10/13/2022  7:40 AM Patricia Bustillos PT [facility]

## 2022-10-03 ENCOUNTER — APPOINTMENT (OUTPATIENT)
Dept: PHYSICAL THERAPY | Age: 67
End: 2022-10-03

## 2022-10-06 ENCOUNTER — APPOINTMENT (OUTPATIENT)
Dept: PHYSICAL THERAPY | Age: 67
End: 2022-10-06

## 2022-10-10 ENCOUNTER — APPOINTMENT (OUTPATIENT)
Dept: PHYSICAL THERAPY | Age: 67
End: 2022-10-10

## 2022-10-13 ENCOUNTER — APPOINTMENT (OUTPATIENT)
Dept: PHYSICAL THERAPY | Age: 67
End: 2022-10-13

## 2022-10-17 ENCOUNTER — APPOINTMENT (OUTPATIENT)
Dept: PHYSICAL THERAPY | Age: 67
End: 2022-10-17

## 2022-10-20 ENCOUNTER — APPOINTMENT (OUTPATIENT)
Dept: PHYSICAL THERAPY | Age: 67
End: 2022-10-20

## 2025-04-18 ENCOUNTER — HOSPITAL ENCOUNTER (OUTPATIENT)
Facility: HOSPITAL | Age: 70
Setting detail: RECURRING SERIES
Discharge: HOME | End: 2025-04-21
Payer: MEDICARE

## 2025-04-18 PROCEDURE — 97110 THERAPEUTIC EXERCISES: CPT

## 2025-04-18 PROCEDURE — 97162 PT EVAL MOD COMPLEX 30 MIN: CPT

## 2025-04-18 NOTE — PROGRESS NOTES
PHYSICAL / OCCUPATIONAL THERAPY - DAILY TREATMENT NOTE    Patient Name: Tete Aldrich-Day    Date: 2025    : 1955  Insurance: Payor: MEDICARE / Plan: MEDICARE PART A AND B / Product Type: *No Product type* /      Patient  verified Yes     Visit #   Current / Total 1 16   Time   In / Out 9:00 9:45   Pain   In / Out 3/10 3/10   Subjective Functional Status/Changes: See Eval/POC.     TREATMENT AREA =  Peroneal tendinitis, left leg    OBJECTIVE    35 min [x]Eval - untimed                      Therapeutic Procedures:    Tx Min Billable or 1:1 Min (if diff from Tx Min) Procedure, Rationale, Specifics   10 10 [id number] Therapeutic Exercise (timed):  increase ROM, strength, coordination, balance, and proprioception to improve patient's ability to progress to PLOF and address remaining functional goals. (see flow sheet as applicable)     Details if applicable:  Patient instructed in and briefly performed beginning HEP.  Handouts issued with pictures and written directions for HEP; copies placed in chart.          Details if applicable:            Details if applicable:            Details if applicable:            Details if applicable:     10 10 MC BC Totals Reminder: bill using total billable min of TIMED therapeutic procedures (example: do not include dry needle or estim unattended, both untimed codes, in totals to left)  8-22 min = 1 unit; 23-37 min = 2 units; 38-52 min = 3 units; 53-67 min = 4 units; 68-82 min = 5 units   Total Total     Charge Capture    [x]  Patient Education billed concurrently with other procedures   [x] Review HEP    [] Progressed/Changed HEP, detail:    [] Other detail:       Objective Information/Functional Measures/Assessment    See Eval/POC.    Patient will continue to benefit from skilled PT / OT services to modify and progress therapeutic interventions, analyze and address functional mobility deficits, analyze and address ROM deficits, analyze and address strength deficits, analyze

## 2025-04-18 NOTE — THERAPY EVALUATION
begin mowing lawn with self-propelled walk-behind mower due to  with cardiac issues--bothersome for ankle.  Household Chores:  OK for indoor chores, but carrying laundry up stairs.  Driving:  automatic transmission and OK; OK getting into car, but painful when first out of car after driving home from working all day.  :  NA; to visit with grandchildren in [facility] 4 days soon (ages 6-12 years old); Also to visit with 4 year old grandchild in [place] soon and running around and up/down for play is painful for left ankle.  Pet Care:  NA.  ADLs:  OK in/out of walk-in shower and standing to bathe; OK standing for grooming and dressing; limited to orthopedic tennis shoes for work and [employer] sandals and slides; now wearing cam boot.  Sleep:  Poor ability to fall asleep, but not due to ankle pain, can lie in any desired position; may be awakened in the night by ankle pain; feels better in the mornings and worse as day goes on.  Recreation/Fitness:  walking, beach walking,     Pain Scale:  3/10 currently, ranging from 3/10 up to 9/10.  LEFS Score:  44/80.  Observation(sitting, feet on floor):  high bilat foot arches, toes rectus, bilat calcaneal inversion; swelling about left ankle, lateral > medial.  Observation/Posture (standing):  moderate bilat foot arches and bilat calcaneal inversion; swelling about left ankle, lateral > medial.  Sit <==> Stand:  WFL, symmetrical.  Gait:  slowed purnima, mildly antalgic left with decreased stance time and pain in weightbearing, especially push-off.  Bend/Squat:  Not Tested.  Single Leg Stance (SLS):  Able and OK left and right; balance steady and >/= 15 seconds right, but ~8 seconds left.  Heel Raises in SLS:  WFL and OK right; ~4 to 4+/5 left with discomfort.  Heel Walking:  able on right >> left.  Left LE AROM (seated):  Ankle PF 65 deg with calf cramping; DF 7 deg; INV 40 deg with end range discomfort; EV 20 deg with discomfort.  Pt notes and demonstrated poor ability

## 2025-04-22 ENCOUNTER — HOSPITAL ENCOUNTER (OUTPATIENT)
Facility: HOSPITAL | Age: 70
Setting detail: RECURRING SERIES
Discharge: HOME | End: 2025-04-25
Payer: MEDICARE

## 2025-04-22 PROCEDURE — 97110 THERAPEUTIC EXERCISES: CPT

## 2025-04-22 PROCEDURE — 97140 MANUAL THERAPY 1/> REGIONS: CPT

## 2025-04-22 PROCEDURE — 97035 APP MDLTY 1+ULTRASOUND EA 15: CPT

## 2025-04-22 NOTE — PROGRESS NOTES
PHYSICAL / OCCUPATIONAL THERAPY - DAILY TREATMENT NOTE    Patient Name: Tete Aldrich-Day    Date: 2025    : 1955  Insurance: Payor: MEDICARE / Plan: MEDICARE PART A AND B / Product Type: *No Product type* /      Patient  verified Yes     Visit #   Current / Total 2 16   Time   In / Out 5:43 6:21   Pain   In / Out 6/10 6/10   Subjective Functional Status/Changes: Pt recently returned from trip to [place] and being with grandchildren--wore boot, but on feet a lot.  Got home ~1AM this morning and now just off from work (wore boot).  Pt noting elevated left lateral ankle pain and swelling.  Pt has done a little bit of HEP since evaluation visit--no problems/questions.     TREATMENT AREA =  Peroneal tendinitis, left leg  Left ankle pain  Left foot pain    OBJECTIVE    Modalities Rationale:     decrease edema, decrease inflammation, decrease pain, and increase tissue extensibility to improve patient's ability to progress to PLOF and address remaining functional goals.     min [] Estim Unattended, type/location:                                      []  w/ice    []  w/heat    min [] Estim Attended, type/location:                                     []  w/US     []  w/ice    []  w/heat    []  TENS insruct      min []  Mechanical Traction: type/lbs                   []  pro   []  sup   []  int   []  cont    []  before manual    []  after manual   8-10 min [x]  Ultrasound, settings/location:    1.0 W/cm2 3.3 MHz 100% to left lateral ankle area in supine with LE's elevated.   -- min  unbill []  Ice     []  Heat    location/position: Pt to ice at home post-tx.    min []  Paraffin,  details:     min []  Vasopneumatic Device, press/temp:     min []  Whirlpool / Fluido:    If using vaso (only need to measure limb vaso being performed on)      pre-treatment girth :       post-treatment girth :       measured at (landmark location) :      min []  Other:    Skin assessment post-treatment:   Intact      Therapeutic

## 2025-04-23 ENCOUNTER — HOSPITAL ENCOUNTER (OUTPATIENT)
Facility: HOSPITAL | Age: 70
Setting detail: RECURRING SERIES
Discharge: HOME | End: 2025-04-26
Payer: MEDICARE

## 2025-04-23 PROCEDURE — 97140 MANUAL THERAPY 1/> REGIONS: CPT

## 2025-04-23 PROCEDURE — 97110 THERAPEUTIC EXERCISES: CPT

## 2025-04-23 PROCEDURE — 97035 APP MDLTY 1+ULTRASOUND EA 15: CPT

## 2025-04-23 NOTE — PROGRESS NOTES
PHYSICAL / OCCUPATIONAL THERAPY - DAILY TREATMENT NOTE    Patient Name: Tete Aldrich-Day    Date: 2025    : 1955  Insurance: Payor: MEDICARE / Plan: MEDICARE PART A AND B / Product Type: *No Product type* /      Patient  verified Yes     Visit #   Current / Total 2 16   Time   In / Out 700 740   Pain   In / Out 3 4   Subjective Functional Status/Changes: It hurts ever single day, every single minute and it varies.     TREATMENT AREA =  Peroneal tendinitis, left leg  Left ankle pain  Left foot pain    OBJECTIVE    Modalities Rationale:     decrease inflammation, decrease pain, and increase tissue extensibility to improve patient's ability to progress to PLOF and address remaining functional goals.     min [] Estim Unattended, type/location:                                      []  w/ice    []  w/heat    min [] Estim Attended, type/location:                                     []  w/US     []  w/ice    []  w/heat    []  TENS insruct      min []  Mechanical Traction: type/lbs                   []  pro   []  sup   []  int   []  cont    []  before manual    []  after manual   8 min [x]  Ultrasound, settings/location:  1.0 W/cm2 3.3 MHz 100% to left lateral ankle area in supine with LE's elevated.     min  unbill []  Ice     []  Heat    location/position:     min []  Paraffin,  details:     min []  Vasopneumatic Device, press/temp:     min []  Whirlpool / Fluido:    If using vaso (only need to measure limb vaso being performed on)      pre-treatment girth :       post-treatment girth :       measured at (landmark location) :      min []  Other:    Skin assessment post-treatment:   Intact      Therapeutic Procedures:    Tx Min Billable or 1:1 Min (if diff from Tx Min) Procedure, Rationale, Specifics   15  73009 Manual Therapy (timed):  decrease pain, increase ROM, and increase tissue extensibility to improve patient's ability to progress to PLOF and address remaining functional goals.  The manual therapy

## 2025-05-05 ENCOUNTER — HOSPITAL ENCOUNTER (OUTPATIENT)
Facility: HOSPITAL | Age: 70
Setting detail: RECURRING SERIES
Discharge: HOME | End: 2025-05-08
Payer: MEDICARE

## 2025-05-05 PROCEDURE — 97110 THERAPEUTIC EXERCISES: CPT

## 2025-05-05 PROCEDURE — 97035 APP MDLTY 1+ULTRASOUND EA 15: CPT

## 2025-05-05 PROCEDURE — 97535 SELF CARE MNGMENT TRAINING: CPT

## 2025-05-05 PROCEDURE — 97140 MANUAL THERAPY 1/> REGIONS: CPT

## 2025-05-05 NOTE — PROGRESS NOTES
PHYSICAL / OCCUPATIONAL THERAPY - DAILY TREATMENT NOTE    Patient Name: Tete Aldrich-Day    Date: 2025    : 1955  Insurance: Payor: MEDICARE / Plan: MEDICARE PART A AND B / Product Type: *No Product type* /      Patient  verified Yes     Visit #   Current / Total 3 16   Time   In / Out 1020 1115   Pain   In / Out 0 0   Subjective Functional Status/Changes: My (L) is feeling very good but now my right foot is not doing well at all.     TREATMENT AREA =  Peroneal tendinitis, left leg  Left ankle pain  Left foot pain    OBJECTIVE    Modalities Rationale:     decrease inflammation, decrease pain, and increase tissue extensibility to improve patient's ability to progress to PLOF and address remaining functional goals.     min [] Estim Unattended, type/location:                                      []  w/ice    []  w/heat    min [] Estim Attended, type/location:                                     []  w/US     []  w/ice    []  w/heat    []  TENS insruct      min []  Mechanical Traction: type/lbs                   []  pro   []  sup   []  int   []  cont    []  before manual    []  after manual   8 min [x]  Ultrasound, settings/location:  1.0 W/cm2 3.3 MHz 100% to left lateral ankle area in supine with LE's elevated.     min  unbill []  Ice     []  Heat    location/position:     min []  Paraffin,  details:     min []  Vasopneumatic Device, press/temp:     min []  Whirlpool / Fluido:    If using vaso (only need to measure limb vaso being performed on)      pre-treatment girth :       post-treatment girth :       measured at (landmark location) :      min []  Other:    Skin assessment post-treatment:   Intact      Therapeutic Procedures:    Tx Min Billable or 1:1 Min (if diff from Tx Min) Procedure, Rationale, Specifics   15  83079 Manual Therapy (timed):  decrease pain, increase ROM, and increase tissue extensibility to improve patient's ability to progress to PLOF and address remaining functional goals.  The

## 2025-05-09 ENCOUNTER — HOSPITAL ENCOUNTER (OUTPATIENT)
Facility: HOSPITAL | Age: 70
Setting detail: RECURRING SERIES
Discharge: HOME | End: 2025-05-12
Payer: MEDICARE

## 2025-05-09 PROCEDURE — 97110 THERAPEUTIC EXERCISES: CPT

## 2025-05-09 PROCEDURE — 97035 APP MDLTY 1+ULTRASOUND EA 15: CPT

## 2025-05-09 PROCEDURE — 97140 MANUAL THERAPY 1/> REGIONS: CPT

## 2025-05-09 NOTE — PROGRESS NOTES
PHYSICAL / OCCUPATIONAL THERAPY - DAILY TREATMENT NOTE    Patient Name: Tete Aldrich-Day    Date: 2025    : 1955  Insurance: Payor: MEDICARE / Plan: MEDICARE PART A AND B / Product Type: *No Product type* /      Patient  verified Yes     Visit #   Current / Total 4 16   Time   In / Out 1020 1100   Pain   In / Out 4 0   Subjective Functional Status/Changes: I don't know why, but I woke up in a lot of pain in my ankle this morning.  I did go to the [employer].     TREATMENT AREA =  Peroneal tendinitis, left leg  Left ankle pain  Left foot pain    OBJECTIVE    Modalities Rationale:     decrease inflammation, decrease pain, and increase tissue extensibility to improve patient's ability to progress to PLOF and address remaining functional goals.     min [] Estim Unattended, type/location:                                      []  w/ice    []  w/heat    min [] Estim Attended, type/location:                                     []  w/US     []  w/ice    []  w/heat    []  TENS insruct      min []  Mechanical Traction: type/lbs                   []  pro   []  sup   []  int   []  cont    []  before manual    []  after manual   8 min [x]  Ultrasound, settings/location:  1.0 W/cm2 3.3 MHz 100% to left lateral ankle area in supine with LE's elevated.     min  unbill []  Ice     []  Heat    location/position:     min []  Paraffin,  details:     min []  Vasopneumatic Device, press/temp:     min []  Whirlpool / Fluido:    If using vaso (only need to measure limb vaso being performed on)      pre-treatment girth :       post-treatment girth :       measured at (landmark location) :      min []  Other:    Skin assessment post-treatment:   Intact      Therapeutic Procedures:    Tx Min Billable or 1:1 Min (if diff from Tx Min) Procedure, Rationale, Specifics   15  65882 Manual Therapy (timed):  decrease pain, increase ROM, and increase tissue extensibility to improve patient's ability to progress to PLOF and

## 2025-05-12 ENCOUNTER — HOSPITAL ENCOUNTER (OUTPATIENT)
Facility: HOSPITAL | Age: 70
Setting detail: RECURRING SERIES
Discharge: HOME | End: 2025-05-15
Payer: MEDICARE

## 2025-05-12 PROCEDURE — 97110 THERAPEUTIC EXERCISES: CPT

## 2025-05-12 PROCEDURE — 97035 APP MDLTY 1+ULTRASOUND EA 15: CPT

## 2025-05-12 PROCEDURE — 97140 MANUAL THERAPY 1/> REGIONS: CPT

## 2025-05-12 NOTE — PROGRESS NOTES
PHYSICAL / OCCUPATIONAL THERAPY - DAILY TREATMENT NOTE    Patient Name: Tete Aldrich-Day    Date: 2025    : 1955  Insurance: Payor: MEDICARE / Plan: MEDICARE PART A AND B / Product Type: *No Product type* /      Patient  verified Yes     Visit #   Current / Total 5 16   Time   In / Out 1020 1105   Pain   In / Out 0 0   Subjective Functional Status/Changes: I just get furious because I don't know what shoes to wear.     TREATMENT AREA =  Peroneal tendinitis, left leg  Left ankle pain  Left foot pain    OBJECTIVE    Modalities Rationale:     decrease inflammation, decrease pain, and increase tissue extensibility to improve patient's ability to progress to PLOF and address remaining functional goals.     min [] Estim Unattended, type/location:                                      []  w/ice    []  w/heat    min [] Estim Attended, type/location:                                     []  w/US     []  w/ice    []  w/heat    []  TENS insruct      min []  Mechanical Traction: type/lbs                   []  pro   []  sup   []  int   []  cont    []  before manual    []  after manual   8 min [x]  Ultrasound, settings/location:  1.0 W/cm2 3.3 MHz 100% to left lateral ankle area in supine with LE's elevated.     min  unbill []  Ice     []  Heat    location/position:     min []  Paraffin,  details:     min []  Vasopneumatic Device, press/temp:     min []  Whirlpool / Fluido:    If using vaso (only need to measure limb vaso being performed on)      pre-treatment girth :       post-treatment girth :       measured at (landmark location) :      min []  Other:    Skin assessment post-treatment:   Intact      Therapeutic Procedures:    Tx Min Billable or 1:1 Min (if diff from Tx Min) Procedure, Rationale, Specifics   15  45469 Manual Therapy (timed):  decrease pain, increase ROM, and increase tissue extensibility to improve patient's ability to progress to PLOF and address remaining functional goals.  The manual therapy

## 2025-05-16 ENCOUNTER — HOSPITAL ENCOUNTER (OUTPATIENT)
Facility: HOSPITAL | Age: 70
Setting detail: RECURRING SERIES
Discharge: HOME | End: 2025-05-19
Payer: MEDICARE

## 2025-05-16 PROCEDURE — 97140 MANUAL THERAPY 1/> REGIONS: CPT

## 2025-05-16 PROCEDURE — 97035 APP MDLTY 1+ULTRASOUND EA 15: CPT

## 2025-05-16 PROCEDURE — 97535 SELF CARE MNGMENT TRAINING: CPT

## 2025-05-16 NOTE — PROGRESS NOTES
7:00 AM Cam Garcia PTA [facility]   5/19/2025  7:00 AM Cam Garcia PTA [facility]

## 2025-05-19 ENCOUNTER — HOSPITAL ENCOUNTER (OUTPATIENT)
Facility: HOSPITAL | Age: 70
Setting detail: RECURRING SERIES
Discharge: HOME | End: 2025-05-22
Payer: MEDICARE

## 2025-05-19 PROCEDURE — 97035 APP MDLTY 1+ULTRASOUND EA 15: CPT

## 2025-05-19 PROCEDURE — 97110 THERAPEUTIC EXERCISES: CPT

## 2025-05-19 PROCEDURE — 97140 MANUAL THERAPY 1/> REGIONS: CPT

## 2025-05-19 NOTE — PROGRESS NOTES
therapy interventions were performed at a separate and distinct time from the therapeutic activities interventions . (see flow sheet as applicable)     Details if applicable:  retrograde massage, gentle eversion with pf stretch, DTM along lateral malleoli and peroneal region.     17  78602 Therapeutic Exercise (timed):  increase ROM, strength, coordination, balance, and proprioception to improve patient's ability to progress to PLOF and address remaining functional goals. (see flow sheet as applicable)     Details if applicable:       38817 Neuromuscular Re-Education (timed):  improve balance, coordination, kinesthetic sense, posture, core stability and proprioception to improve patient's ability to develop conscious control of individual muscles and awareness of position of extremities in order to progress to PLOF and address remaining functional goals. (see flow sheet as applicable)     Details if applicable:       58338 Therapeutic Activity (timed):  use of dynamic activities replicating functional movements to increase ROM, strength, coordination, balance, and proprioception in order to improve patient's ability to progress to PLOF and address remaining functional goals.  (see flow sheet as applicable)     Details if applicable:        54715 Self Care/Home Management (timed):  improve patient knowledge and understanding of positioning, activity modification, and joint protection strategies  to improve patient's ability to progress to PLOF and address remaining functional goals.  (see flow sheet as applicable)       Details if applicable:  see above.   32  [facility] Totals Reminder: bill using total billable min of TIMED therapeutic procedures (example: do not include dry needle or estim unattended, both untimed codes, in totals to left)  8-22 min = 1 unit; 23-37 min = 2 units; 38-52 min = 3 units; 53-67 min = 4 units; 68-82 min = 5 units   Total Total     Charge Capture    [x]  Patient Education billed concurrently

## 2025-05-19 NOTE — THERAPY RECERTIFICATION
[facility]UL[facility]LLAN[facility]  [address], [address], [place] [address] Ph: [phone] Fx: [phone]  PHYSICAL THERAPY PROGRESS NOTE  Patient Name: Tete Vicente : 1955   Medical/Treatment Diagnosis: Peroneal tendinitis, left leg  Left ankle pain  Left foot pain   Referral Source: Catalino Romero MD     Date of Initial Visit: 25 Attended Visits: 8 Missed Visits: 0     SUMMARY OF TREATMENT    Pt seen in clinic for to address Left ankle pain [M25.572]  Left foot pain [M79.672]. Pt has been assessed, completed therapeutic exercises, neuromuscular re-ed, therapeutic functional activity, received manual therapy intervention, self-care strategies, HEP techniques and pt ed on condition consistency and follow-through.       CURRENT STATUS    Patient has had good tolerance to all therapeutic interventions and has reported good improvement with all functional mobility activities and general ADLs.  Patient reports continued pain and discomfort with work related activities and is unable to perform an 8 hour shift without the use of her boot.      GROC: +5 a good deal better     Patient reports ~ 60% reduction of symptoms.     Patient reports ~ 70% overall improvement with performance of all functional mobility activities and general ADLs.  Atill unable to perform 8 hour shifts without significant increase of pain/symptoms.     Patient reports standing tolerance: no problems, walking tolerance: 10 minutes.     Patient negotiates 1 flight of stairs with reciprocal pattern and unilateral handrail without difficulty of pain/symptoms.     Gait: near normal gait.     (L) ankle AROM:  DF: 9 degrees  PF: 40 degrees  Ever: 7 degrees  Inver: 42 degrees     Progress toward goals / Updated goals:  []  See Progress Note/Recertification     Short Term Goals: To be accomplished in 4 weeks  Patient Independent and compliant with progressive HEP/Self-Care Routine.

## 2025-06-16 ENCOUNTER — HOSPITAL ENCOUNTER (OUTPATIENT)
Facility: HOSPITAL | Age: 70
Setting detail: RECURRING SERIES
Discharge: HOME | End: 2025-06-19
Payer: MEDICARE

## 2025-06-16 PROCEDURE — 97035 APP MDLTY 1+ULTRASOUND EA 15: CPT

## 2025-06-16 PROCEDURE — 97140 MANUAL THERAPY 1/> REGIONS: CPT

## 2025-06-16 PROCEDURE — 97110 THERAPEUTIC EXERCISES: CPT

## 2025-06-16 NOTE — PROGRESS NOTES
PHYSICAL / OCCUPATIONAL THERAPY - DAILY TREATMENT NOTE    Patient Name: Tete Aldrich-Day    Date: 2025    : 1955  Insurance: Payor: MEDICARE / Plan: MEDICARE PART A AND B / Product Type: *No Product type* /      Patient  verified Yes     Visit #   Current / Total 8 16   Time   In / Out 220 300   Pain   In / Out 3 3   Subjective Functional Status/Changes: I'm just aware that there is something there in my ankle.     TREATMENT AREA =  Peroneal tendinitis, left leg  Left ankle pain  Left foot pain    OBJECTIVE    Modalities Rationale:     decrease inflammation, decrease pain, and increase tissue extensibility to improve patient's ability to progress to PLOF and address remaining functional goals.     min [] Estim Unattended, type/location:                                      []  w/ice    []  w/heat    min [] Estim Attended, type/location:                                     []  w/US     []  w/ice    []  w/heat    []  TENS insruct      min []  Mechanical Traction: type/lbs                   []  pro   []  sup   []  int   []  cont    []  before manual    []  after manual   8 min [x]  Ultrasound, settings/location:  1.0 W/cm2 3.3 MHz 100% to left lateral ankle area in supine with LE's elevated.     min  unbill []  Ice     []  Heat    location/position:     min []  Paraffin,  details:     min []  Vasopneumatic Device, press/temp:     min []  Whirlpool / Fluido:    If using vaso (only need to measure limb vaso being performed on)      pre-treatment girth :       post-treatment girth :       measured at (landmark location) :      min []  Other:    Skin assessment post-treatment:   Intact      Therapeutic Procedures:    Tx Min Billable or 1:1 Min (if diff from Tx Min) Procedure, Rationale, Specifics   15  95379 Manual Therapy (timed):  decrease pain, increase ROM, and increase tissue extensibility to improve patient's ability to progress to PLOF and address remaining functional goals.  The manual therapy

## 2025-06-16 NOTE — THERAPY RECERTIFICATION
unilateral handrail without difficulty of pain/symptoms.     Gait: near normal gait.     (L) ankle AROM:  DF: 9 degrees  PF: 40 degrees  Ever: 7 degrees  Inver: 42 degrees    Problem List: pain affecting function, decrease ROM, decrease strength, edema affection function, impaired gait/balance, decrease ADL/functional abilities, decrease activity tolerance, decrease flexibility/joint mobility, and decrease transfer abilities      Treatment Plan may include any combination of the followin Therapeutic Exercise, 14532 Neuromuscular Re-Education, 72261 Manual Therapy, 27846 Therapeutic Activity, 48962 Self Care/Home Management, 23424 Electrical Stim unattended /  (MCR), 22377 Electrical Stim attended, and 15008 Ultrasound  Patient Goal(s) has been updated and includes:     Goals for this certification period include and are to be achieved in   6-12 WEEKS    Continue with all above goals    Frequency / Duration:   Patient to be seen  2   times per week for   6-12   WEEKS    Assessments/Recommendations: Recommend patient to continue physical therapy to allow patient to achieve all LTG's and return to [facility]    If you have any questions/comments please contact us directly at [phone].   Thank you for allowing us to assist in the care of your patient.    Certification Period: 25 to 25    Reporting Period (date from last assessment to current assessment): 25 - 25    Cam Garcia, [facility]       2025       6:57 AM      ___ I have read the above report and request that my patient continue as recommended.   ___ I have read the above report and request that my patient continue therapy with the following changes/special instructions: ________________________________________________   ___ I have read the above report and request that my patient be discharged from therapy.     Physician's Signature:_________________________   DATE:_________   TIME:________                           Nick

## 2025-06-20 ENCOUNTER — HOSPITAL ENCOUNTER (OUTPATIENT)
Facility: HOSPITAL | Age: 70
Setting detail: RECURRING SERIES
Discharge: HOME | End: 2025-06-23
Payer: MEDICARE

## 2025-06-20 PROCEDURE — 97035 APP MDLTY 1+ULTRASOUND EA 15: CPT

## 2025-06-20 PROCEDURE — 97140 MANUAL THERAPY 1/> REGIONS: CPT

## 2025-06-20 PROCEDURE — 97110 THERAPEUTIC EXERCISES: CPT

## 2025-06-20 NOTE — PROGRESS NOTES
improve patient's ability to progress to PLOF and address remaining functional goals.  The manual therapy interventions were performed at a separate and distinct time from the therapeutic activities interventions . (see flow sheet as applicable)     Details if applicable:  retrograde massage, gentle eversion with pf stretch, DTM along lateral malleoli and peroneal region.     17  76897 Therapeutic Exercise (timed):  increase ROM, strength, coordination, balance, and proprioception to improve patient's ability to progress to PLOF and address remaining functional goals. (see flow sheet as applicable)     Details if applicable:       82335 Neuromuscular Re-Education (timed):  improve balance, coordination, kinesthetic sense, posture, core stability and proprioception to improve patient's ability to develop conscious control of individual muscles and awareness of position of extremities in order to progress to PLOF and address remaining functional goals. (see flow sheet as applicable)     Details if applicable:       09577 Therapeutic Activity (timed):  use of dynamic activities replicating functional movements to increase ROM, strength, coordination, balance, and proprioception in order to improve patient's ability to progress to PLOF and address remaining functional goals.  (see flow sheet as applicable)     Details if applicable:        87677 Self Care/Home Management (timed):  improve patient knowledge and understanding of positioning, activity modification, and joint protection strategies  to improve patient's ability to progress to PLOF and address remaining functional goals.  (see flow sheet as applicable)       Details if applicable:  see above.   32  [facility] Totals Reminder: bill using total billable min of TIMED therapeutic procedures (example: do not include dry needle or estim unattended, both untimed codes, in totals to left)  8-22 min = 1 unit; 23-37 min = 2 units; 38-52 min = 3 units; 53-67 min = 4 units;

## 2025-06-25 ENCOUNTER — HOSPITAL ENCOUNTER (OUTPATIENT)
Facility: HOSPITAL | Age: 70
Setting detail: RECURRING SERIES
Discharge: HOME | End: 2025-06-28
Payer: MEDICARE

## 2025-06-25 PROCEDURE — 97035 APP MDLTY 1+ULTRASOUND EA 15: CPT

## 2025-06-25 PROCEDURE — 97110 THERAPEUTIC EXERCISES: CPT

## 2025-06-25 PROCEDURE — 97140 MANUAL THERAPY 1/> REGIONS: CPT

## 2025-06-25 NOTE — PROGRESS NOTES
PHYSICAL / OCCUPATIONAL THERAPY - DAILY TREATMENT NOTE    Patient Name: Tete Aldrich-Day    Date: 2025    : 1955  Insurance: Payor: MEDICARE / Plan: MEDICARE PART A AND B / Product Type: *No Product type* /      Patient  verified Yes     Visit #   Current / Total 10 16   Time   In / Out 140 220   Pain   In / Out 3 2   Subjective Functional Status/Changes: It's an effort, but I'm trying to walk normal     TREATMENT AREA =  Peroneal tendinitis, left leg  Left ankle pain  Left foot pain    OBJECTIVE    Modalities Rationale:     decrease inflammation, decrease pain, and increase tissue extensibility to improve patient's ability to progress to PLOF and address remaining functional goals.     min [] Estim Unattended, type/location:                                      []  w/ice    []  w/heat    min [] Estim Attended, type/location:                                     []  w/US     []  w/ice    []  w/heat    []  TENS insruct      min []  Mechanical Traction: type/lbs                   []  pro   []  sup   []  int   []  cont    []  before manual    []  after manual   8 min [x]  Ultrasound, settings/location:  1.2W/cm2 1 MHz 50% to left lateral ankle area in side lying with LE's on pillow.    10 min  unbill [x]  Ice     []  Heat    location/position:     min []  Paraffin,  details:     min []  Vasopneumatic Device, press/temp:     min []  Whirlpool / Fluido:    If using vaso (only need to measure limb vaso being performed on)      pre-treatment girth :       post-treatment girth :       measured at (landmark location) :      min []  Other:    Skin assessment post-treatment:   Intact      Therapeutic Procedures:    Tx Min Billable or 1:1 Min (if diff from Tx Min) Procedure, Rationale, Specifics   15  98665 Manual Therapy (timed):  decrease pain, increase ROM, and increase tissue extensibility to improve patient's ability to progress to PLOF and address remaining functional goals.  The manual therapy

## 2025-06-26 ENCOUNTER — HOSPITAL ENCOUNTER (OUTPATIENT)
Facility: HOSPITAL | Age: 70
Setting detail: RECURRING SERIES
Discharge: HOME | End: 2025-06-29
Payer: MEDICARE

## 2025-06-26 PROCEDURE — 97110 THERAPEUTIC EXERCISES: CPT

## 2025-06-26 PROCEDURE — 97035 APP MDLTY 1+ULTRASOUND EA 15: CPT

## 2025-06-26 PROCEDURE — 97530 THERAPEUTIC ACTIVITIES: CPT

## 2025-06-26 NOTE — PROGRESS NOTES
PHYSICAL / OCCUPATIONAL THERAPY - DAILY TREATMENT NOTE    Patient Name: Tete Aldrich-Day    Date: 2025    : 1955  Insurance: Payor: MEDICARE / Plan: MEDICARE PART A AND B / Product Type: *No Product type* /      Patient  verified Yes     Visit #   Current / Total 12 16   Time   In / Out 2:22 3:00   Pain   In / Out 0/10 2/10   Subjective Functional Status/Changes: Pt reports no current pain, but took meds this AM and helpful.  Pt wearing boot all the time when working and helpful; painful if did not wear it.  Pt to see MD 25.      TREATMENT AREA =  Peroneal tendinitis, left leg  Left ankle pain  Left foot pain    OBJECTIVE    Modalities Rationale:     decrease edema, decrease inflammation, decrease pain, and increase tissue extensibility to improve patient's ability to progress to PLOF and address remaining functional goals.     min [] Estim Unattended, type/location:                                      []  w/ice    []  w/heat    min [] Estim Attended, type/location:                                     []  w/US     []  w/ice    []  w/heat    []  TENS insruct      min []  Mechanical Traction: type/lbs                   []  pro   []  sup   []  int   []  cont    []  before manual    []  after manual   8 min [x]  Ultrasound, settings/location:    1.2 W/cm2 @ 1 MHz 100% to left lateral ankle/peroneal area in right sidelying.   0 min  unbill []  Ice     []  Heat    location/position:     min []  Paraffin,  details:     min []  Vasopneumatic Device, press/temp:     min []  Whirlpool / Fluido:    If using vaso (only need to measure limb vaso being performed on)      pre-treatment girth :       post-treatment girth :       measured at (landmark location) :      min []  Other:    Skin assessment post-treatment:   Intact      Therapeutic Procedures:    Tx Min Billable or 1:1 Min (if diff from Tx Min) Procedure, Rationale, Specifics   [phone] [id number] Therapeutic Exercise (timed):  increase ROM, strength,

## 2025-07-02 ENCOUNTER — APPOINTMENT (OUTPATIENT)
Facility: HOSPITAL | Age: 70
End: 2025-07-02
Payer: MEDICARE

## 2025-07-03 ENCOUNTER — APPOINTMENT (OUTPATIENT)
Facility: HOSPITAL | Age: 70
End: 2025-07-03
Payer: MEDICARE

## 2025-07-08 ENCOUNTER — HOSPITAL ENCOUNTER (OUTPATIENT)
Facility: HOSPITAL | Age: 70
Setting detail: RECURRING SERIES
Discharge: HOME | End: 2025-07-11
Payer: MEDICARE

## 2025-07-08 ENCOUNTER — APPOINTMENT (OUTPATIENT)
Facility: HOSPITAL | Age: 70
End: 2025-07-08
Payer: MEDICARE

## 2025-07-08 PROCEDURE — 97035 APP MDLTY 1+ULTRASOUND EA 15: CPT

## 2025-07-08 PROCEDURE — 97530 THERAPEUTIC ACTIVITIES: CPT

## 2025-07-08 PROCEDURE — 97535 SELF CARE MNGMENT TRAINING: CPT

## 2025-07-08 NOTE — PROGRESS NOTES
PHYSICAL / OCCUPATIONAL THERAPY - DAILY TREATMENT NOTE    Patient Name: Tete Aldrich-Day    Date: 2025    : 1955  Insurance: Payor: MEDICARE / Plan: MEDICARE PART A AND B / Product Type: *No Product type* /      Patient  verified Yes     Visit #   Current / Total 13 16   Time   In / Out 220 300   Pain   In / Out 3 2   Subjective Functional Status/Changes: I ended up in the ER last Tuesday.  It was not because of my foot.     TREATMENT AREA =  Peroneal tendinitis, left leg  Left ankle pain  Left foot pain    OBJECTIVE    Modalities Rationale:     decrease inflammation, decrease pain, and increase tissue extensibility to improve patient's ability to progress to PLOF and address remaining functional goals.     min [] Estim Unattended, type/location:                                      []  w/ice    []  w/heat    min [] Estim Attended, type/location:                                     []  w/US     []  w/ice    []  w/heat    []  TENS insruct      min []  Mechanical Traction: type/lbs                   []  pro   []  sup   []  int   []  cont    []  before manual    []  after manual   8 min [x]  Ultrasound, settings/location:  1.2W/cm2 1 MHz 50% to left lateral ankle area in side lying with LE's on pillow.     min  unbill []  Ice     []  Heat    location/position:     min []  Paraffin,  details:     min []  Vasopneumatic Device, press/temp:     min []  Whirlpool / Fluido:    If using vaso (only need to measure limb vaso being performed on)      pre-treatment girth :       post-treatment girth :       measured at (landmark location) :      min []  Other:    Skin assessment post-treatment:   Intact      Therapeutic Procedures:    Tx Min Billable or 1:1 Min (if diff from Tx Min) Procedure, Rationale, Specifics     91605 Therapeutic Exercise (timed):  increase ROM, strength, coordination, balance, and proprioception to improve patient's ability to progress to PLOF and address remaining functional goals. (see

## 2025-07-09 ENCOUNTER — APPOINTMENT (OUTPATIENT)
Facility: HOSPITAL | Age: 70
End: 2025-07-09
Payer: MEDICARE

## 2025-07-11 NOTE — THERAPY DISCHARGE
[facility]  [address], [address], [place] [address] Ph: [phone] Fx: [phone]  DISCHARGE SUMMARY FOR PHYSICAL THERAPY          Patient Name: Tete Vicente : 1955   Treatment/Medical Diagnosis: Left ankle pain [M25.572]  Left foot pain [M79.672]   Onset Date: ~     Referral Source: Catalino Romero MD Start of Care (SOC): 2025    Prior Hospitalization: See Medical History Provider #: [id number]   Prior Level of Function: Independent work, ADLs, community mobility, home chores, sleep, and recreation activities.    Comorbidities: Other: Arthritis, Hx/o Hepatitis , Hx/O Right TKR ~' Hypothyroid (takes Synthroid); Hyperlipidemia.    Medications: Verified on Patient Summary List   Visits from SOC: 13 Missed Visits: 0     SUMMARY OF TREATMENT  Pt seen in clinic for to address Left ankle pain [M25.572]  Left foot pain [M79.672]. Pt has been assessed, completed therapeutic exercises, neuromuscular re-ed, therapeutic functional activity, received manual therapy intervention, ultrasound, self-care strategies, HEP techniques and pt ed on condition consistency and follow-through.     Key Functional Changes/Progress:     Patient had limited improvement with PT interventions for (L) foot/ankle diagnosis.  Patient continued to have difficulty advancing activities without use of boot and continued to wear boot while performing work related duties.  Patient's gait varied depending on levels of activities and reported limited improvements with all functional mobility activities including walking/standing activities.  Patient also reported difficulty with stairs negotiation.  Patient stated that she needed to return to MD for further consultation as she felt PT interventions were not working for her at this time.    GROC: +3 somewhat better     (L) ankle strength MMT: 5/5     Performs 10 (B) HR       Short Term Goals: To be accomplished

## 2025-07-14 ENCOUNTER — APPOINTMENT (OUTPATIENT)
Facility: HOSPITAL | Age: 70
End: 2025-07-14
Payer: MEDICARE

## 2025-07-17 ENCOUNTER — APPOINTMENT (OUTPATIENT)
Facility: HOSPITAL | Age: 70
End: 2025-07-17
Payer: MEDICARE

## 2025-07-18 ENCOUNTER — APPOINTMENT (OUTPATIENT)
Facility: HOSPITAL | Age: 70
End: 2025-07-18
Payer: MEDICARE